# Patient Record
Sex: FEMALE | Race: BLACK OR AFRICAN AMERICAN | NOT HISPANIC OR LATINO | Employment: FULL TIME | URBAN - METROPOLITAN AREA
[De-identification: names, ages, dates, MRNs, and addresses within clinical notes are randomized per-mention and may not be internally consistent; named-entity substitution may affect disease eponyms.]

---

## 2017-04-07 ENCOUNTER — TRANSFERRED RECORDS (OUTPATIENT)
Dept: FAMILY MEDICINE | Facility: CLINIC | Age: 57
End: 2017-04-07

## 2021-04-12 ENCOUNTER — APPOINTMENT (OUTPATIENT)
Dept: GENERAL RADIOLOGY | Facility: CLINIC | Age: 61
End: 2021-04-12
Attending: EMERGENCY MEDICINE

## 2021-04-12 ENCOUNTER — HOSPITAL ENCOUNTER (EMERGENCY)
Facility: CLINIC | Age: 61
Discharge: HOME | End: 2021-04-12
Attending: EMERGENCY MEDICINE | Admitting: EMERGENCY MEDICINE

## 2021-04-12 VITALS
RESPIRATION RATE: 24 BRPM | SYSTOLIC BLOOD PRESSURE: 121 MMHG | HEART RATE: 84 BPM | DIASTOLIC BLOOD PRESSURE: 81 MMHG | WEIGHT: 208 LBS | BODY MASS INDEX: 33.43 KG/M2 | OXYGEN SATURATION: 98 % | TEMPERATURE: 98.5 F | HEIGHT: 66 IN

## 2021-04-12 DIAGNOSIS — M94.0 COSTOCHONDRITIS: ICD-10-CM

## 2021-04-12 DIAGNOSIS — R07.9 CHEST PAIN, UNSPECIFIED TYPE: Primary | ICD-10-CM

## 2021-04-12 LAB
ANION GAP SERPL CALCULATED.3IONS-SCNC: 4 MMOL/L (ref 3–14)
BASOPHILS # BLD AUTO: 0 10E9/L (ref 0–0.2)
BASOPHILS NFR BLD AUTO: 0.7 %
BUN SERPL-MCNC: 17 MG/DL (ref 7–30)
CALCIUM SERPL-MCNC: 10.3 MG/DL (ref 8.5–10.1)
CHLORIDE SERPL-SCNC: 110 MMOL/L (ref 94–109)
CO2 SERPL-SCNC: 26 MMOL/L (ref 20–32)
CREAT SERPL-MCNC: 0.74 MG/DL (ref 0.52–1.04)
D DIMER PPP FEU-MCNC: 0.3 UG/ML FEU (ref 0–0.5)
DIFFERENTIAL METHOD BLD: ABNORMAL
EOSINOPHIL # BLD AUTO: 0.2 10E9/L (ref 0–0.7)
EOSINOPHIL NFR BLD AUTO: 2.9 %
ERYTHROCYTE [DISTWIDTH] IN BLOOD BY AUTOMATED COUNT: 14.3 % (ref 10–15)
GFR SERPL CREATININE-BSD FRML MDRD: 88 ML/MIN/{1.73_M2}
GLUCOSE SERPL-MCNC: 171 MG/DL (ref 70–99)
HCT VFR BLD AUTO: 41.1 % (ref 35–47)
HGB BLD-MCNC: 12.9 G/DL (ref 11.7–15.7)
IMM GRANULOCYTES # BLD: 0 10E9/L (ref 0–0.4)
IMM GRANULOCYTES NFR BLD: 0.2 %
INTERPRETATION ECG - MUSE: NORMAL
LYMPHOCYTES # BLD AUTO: 1.3 10E9/L (ref 0.8–5.3)
LYMPHOCYTES NFR BLD AUTO: 21.3 %
MAGNESIUM SERPL-MCNC: 2.3 MG/DL (ref 1.6–2.3)
MCH RBC QN AUTO: 27.8 PG (ref 26.5–33)
MCHC RBC AUTO-ENTMCNC: 31.4 G/DL (ref 31.5–36.5)
MCV RBC AUTO: 89 FL (ref 78–100)
MONOCYTES # BLD AUTO: 0.5 10E9/L (ref 0–1.3)
MONOCYTES NFR BLD AUTO: 8.5 %
NEUTROPHILS # BLD AUTO: 3.9 10E9/L (ref 1.6–8.3)
NEUTROPHILS NFR BLD AUTO: 66.4 %
NRBC # BLD AUTO: 0 10*3/UL
NRBC BLD AUTO-RTO: 0 /100
PLATELET # BLD AUTO: 363 10E9/L (ref 150–450)
POTASSIUM SERPL-SCNC: 3.9 MMOL/L (ref 3.4–5.3)
RBC # BLD AUTO: 4.64 10E12/L (ref 3.8–5.2)
SODIUM SERPL-SCNC: 140 MMOL/L (ref 133–144)
TROPONIN I SERPL-MCNC: <0.015 UG/L (ref 0–0.04)
TSH SERPL DL<=0.005 MIU/L-ACNC: 1.46 MU/L (ref 0.4–4)
WBC # BLD AUTO: 5.9 10E9/L (ref 4–11)

## 2021-04-12 PROCEDURE — 83735 ASSAY OF MAGNESIUM: CPT | Performed by: EMERGENCY MEDICINE

## 2021-04-12 PROCEDURE — 99285 EMERGENCY DEPT VISIT HI MDM: CPT | Mod: 25

## 2021-04-12 PROCEDURE — 93005 ELECTROCARDIOGRAM TRACING: CPT

## 2021-04-12 PROCEDURE — 71046 X-RAY EXAM CHEST 2 VIEWS: CPT

## 2021-04-12 PROCEDURE — 84484 ASSAY OF TROPONIN QUANT: CPT | Performed by: EMERGENCY MEDICINE

## 2021-04-12 PROCEDURE — 85025 COMPLETE CBC W/AUTO DIFF WBC: CPT | Performed by: EMERGENCY MEDICINE

## 2021-04-12 PROCEDURE — 85379 FIBRIN DEGRADATION QUANT: CPT | Performed by: EMERGENCY MEDICINE

## 2021-04-12 PROCEDURE — 80048 BASIC METABOLIC PNL TOTAL CA: CPT | Performed by: EMERGENCY MEDICINE

## 2021-04-12 PROCEDURE — 84443 ASSAY THYROID STIM HORMONE: CPT | Performed by: EMERGENCY MEDICINE

## 2021-04-12 ASSESSMENT — ENCOUNTER SYMPTOMS
ARTHRALGIAS: 1
SHORTNESS OF BREATH: 1
NAUSEA: 0

## 2021-04-12 ASSESSMENT — MIFFLIN-ST. JEOR: SCORE: 1535.23

## 2021-04-12 NOTE — ED PROVIDER NOTES
History   Chief Complaint:  Chest Pain     HPI   Kelly Pineda is a 61 year old female with history of Type II Diabetes and COPD who presents with chest pain. Patient complains of constant right sided chest pain which began five days ago. 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 She noticed the pain when bending over and picking up items. She had difficulty sleeping throughout the week as she was not able to get comfortable. The chest pain radiates to her right shoulder blade and through her right upper back. She also describes a rash under her right breast. She received her last mammogram 2.5 years ago and has not felt any lumps. She describes shortness of breath when walking up the stairs but denies nausea. She had a stress test more than five years ago and has not had chest pain until today. She states she has a history of Type II Diabetes, hyperlipidemia, but no history of hypertension or heart disease. She has a family history of diabetes and heart disease, notably her father in his 40's.      Review of Systems   Respiratory: Positive for shortness of breath.    Cardiovascular: Positive for chest pain.   Gastrointestinal: Negative for nausea.   Musculoskeletal: Positive for arthralgias (right shoulder ).   All other systems reviewed and are negative.    Allergies:  No known drug allergies    Medications:  Aspirin 81 mg   Lipitor   Metformin     Past Medical History:    COPD  Diabetic Neuropathy   Type II Diabetes   Arthritis    Past Surgical History:    Vaginal Hysterectomy   Breast Biopsy     Family History:    She has a family history of diabetes and heart disease, notably her father in his 40's.      Social History:  Arrives unaccompanied.     Physical Exam     Patient Vitals for the past 24 hrs:   BP Temp Temp src Pulse Resp SpO2 Height Weight   04/12/21 1450 121/81 -- -- 84 -- -- -- --   04/12/21 1340 -- -- -- 80 24 -- -- --   04/12/21 1310 -- -- -- 82 18 -- -- --  "  04/12/21 1136 -- -- -- -- -- -- -- 94.3 kg (208 lb)   04/12/21 1127 139/82 98.5  F (36.9  C) Temporal 111 14 98 % 1.676 m (5' 6\") --     Physical Exam  General: Alert, appears well-developed and well-nourished. Cooperative.     In mild distress  HEENT:  Head:  Atraumatic  Ears:  External ears are normal  Mouth/Throat:  Oropharynx is without erythema or exudate and mucous membranes are moist.   Eyes:   Conjunctivae normal and EOM are normal. No scleral icterus.  CV:  Normal rate, regular rhythm, normal heart sounds and radial pulses are 2+ and symmetric.  No murmur.  Resp:  Breath sounds are clear bilaterally    Non-labored, no retractions or accessory muscle use  GI:  Abdomen is soft, no distension, no tenderness. No rebound or guarding.  No CVA tenderness bilaterally  Breast: Breast examination performed in presence of female nurse.  Patient with mild hyperpigmentation to the skin overlying the inferior right breast.  No mass was detected on the right breast examination.  No rash was noticed nor evidence of shingles.  No discharge from the right nipple.  MS:  Normal range of motion. No edema.    Tenderness to right anterior cartilage/rib interface, suspicious for costochondritis.  No crepitus.     Normal strength in all 4 extremities.     Back atraumatic.    No midline cervical, thoracic, or lumbar tenderness  Skin:  Warm and dry.  No rash or lesions noted.  Neuro: Alert. Normal strength.  GCS: 15  Psych:  Normal mood and affect.    Emergency Department Course   ECG  ECG taken at 1129, ECG read at 1216  Sinus tachycardia   Biatrial enlargement   Nonspecific ST and T wave abnormality   Abnormal ECG   Rate 106 bpm. VA interval 142 ms. QRS duration 82 ms. QT/QTc 314/417 ms. P-R-T axes 72 25 128.     Imaging:  XR Chest 2 Views  No radiographic evidence of acute chest abnormality.   Reading per radiology    Laboratory:  CBC: WBC 5.9, HGB 12.9,    BMP: Glucose 171 (H), Chloride 110 (H), o/w WNL (Creatinine: " 0.74)    D Dimer (Collected 1243): 0.3  Troponin (Collected 1243): <0.015  TSH with free T4 reflex: 1.46  Magnesium: 2.3    Emergency Department Course:    Reviewed:  I reviewed nursing notes, past medical history and care everywhere    Assessments:  1213 I obtained history and examined the patient as noted above.   1420 I rechecked the patient and explained findings.     Disposition:  The patient was discharged to home.     Impression & Plan     Medical Decision Making:  Kelly Pineda is a 61 year old female who presents for evaluation of chest pain.  This pain has lasted for >5 days.  Initial laboratory and imaging tests have come back normal.  There has been no progression of symptoms or other new concerning symptoms.        I considered a broad differential diagnosis in this patient including life-threatening etiologies such as acute coronary syndrome, myocardial infarction, pulmonary embolism, acute aortic dissection, myocarditis, pericarditis, acute valvular insufficiency amongst others.  Other causes considered for this patient included pneumonia, pneumothorax, chest wall source, pericarditis, pleurisy, esophageal spasm, etc.  No serious etiology for the chest pain were detected today during this visit.  Exceedingly low risk for unstable angina at this point and will therefore not admit formally and administer heparin.    Discussed HEART score with patient and shared decision making regarding disposition and further workup was done. I will set the patient up for an outpatient stress echocardiogram within 72 hours.  Patient will follow up with her primary care provider to obtain results from this outpatient stress echocardiogram.  She also return sooner to the emergency department for any new or worsening complaints.  After all questions answered, discharged home.    Diagnosis:    ICD-10-CM    1. Chest pain, unspecified type  R07.9 Exercise Stress Echocardiogram   2. Costochondritis  M94.0      Scribe  Disclosure:  I, Inocencio Jasso, am serving as a scribe at 12:07 PM on 4/12/2021 to document services personally performed by Javier Samayoa MD based on my observations and the provider's statements to me.              Javier Samayoa MD  04/12/21 1904

## 2021-04-12 NOTE — ED TRIAGE NOTES
CP in the right upper chest since last Thursday, radiates thru to the right shoulder blade area and the right upper back. No nausea, SOB only when walking up stairs.

## 2023-02-03 NOTE — PATIENT INSTRUCTIONS

## 2023-02-03 NOTE — PROGRESS NOTES
SUBJECTIVE:   CC: Kelly Pineda is an 63 year old woman who presents for preventive health visit.     Joints, knees are shot, hard to walk far.    Diabetes has been off of meds and testing for a while.  Had been on Metformin    Patient has been advised of split billing requirements and indicates understanding: Yes    Healthy Habits:PROBLEMS TO ADD ON...    t three servings of calcium containing foods daily (dairy, green leafy vegetables, etc.)? no    Amount of exercise or daily activities, outside of work: 5 day(s) per week walks to work and back 15-20 minutes     Problems taking medications regularly No    Medication side effects: No    Have you had an eye exam in the past two years? no    Do you see a dentist twice per year? no    Do you have sleep apnea, excessive snoring or daytime drowsiness?no  HEARING FREQUENCY:   Right Ear:     500 Hz :  pass   1000 Hz:  pass   2000 Hz:  pass   4000 Hz:  pass  Left Ear:     500 Hz :  pass   1000 Hz:  pass   2000 Hz:  pass   4000 Hz:  pass  Aurora Bernal CMA 2023        Today's PHQ-2 Score:   PHQ-2 (  [employer]) 2023   Q1: Little interest or pleasure in doing things 1   Q2: Feeling down, depressed or hopeless 1   PHQ-2 Score 2       Abuse: Current or Past(Physical, Sexual or Emotional)- No  Do you feel safe in your environment? Yes    Have you ever done Advance Care Planning? (For example, a Health Directive, POLST, or a discussion with a medical provider or your loved ones about your wishes): No, advance care planning information given to patient to review.  Advanced care planning was discussed at today's visit.    Social History     Tobacco Use     Smoking status: Former     Packs/day: 0.50     Years: 16.00     Pack years: 8.00     Types: Cigarettes     Start date:      Quit date: 2021     Years since quittin.1     Smokeless tobacco: Never   Substance Use Topics     Alcohol use: Not Currently     If you drink alcohol do you typically have >3  drinks per day or >7 drinks per week? No                     Reviewed orders with patient.  Reviewed health maintenance and updated orders accordingly - Yes  Lab work is in process    FHS-7: No flowsheet data found.  click delete button to remove this line now  Mammogram Screening: Recommended mammography every 1-2 years with patient discussion and risk factor consideration  Pertinent mammograms are reviewed under the imaging tab.    Pertinent mammograms are reviewed under the imaging tab.  History of abnormal Pap smear: Status post benign hysterectomy. Health Maintenance and Surgical History updated.     Reviewed and updated as needed this visit by clinical staff   Tobacco  Allergies  Meds  Problems  Med Hx  Surg Hx  Fam Hx  Soc   Hx        Reviewed and updated as needed this visit by Provider   Tobacco  Allergies  Meds  Problems  Med Hx  Surg Hx  Fam Hx  Soc   Hx       Past Medical History:   Diagnosis Date     Diabetes mellitus (H)       Past Surgical History:   Procedure Laterality Date     BREAST BIOPSY, RT/LT  2002    benign     HYSTERECTOMY RADICAL, SALPINGO-OOPHORECTOMY  2005    cervix removed, done for severe fibroids, paps no longer required       ROS:  CONSTITUTIONAL: NEGATIVE for fever, chills, change in weight  INTEGUMENTARY/SKIN: NEGATIVE for worrisome rashes, moles or lesions  EYES: NEGATIVE for vision changes or irritation  ENT: NEGATIVE for ear, mouth and throat problems  RESP: NEGATIVE for significant cough or SOB  BREAST: NEGATIVE for masses, tenderness or discharge  CV: NEGATIVE for chest pain, palpitations or peripheral edema  GI: NEGATIVE for nausea, abdominal pain, heartburn, or change in bowel habits  : NEGATIVE for unusual urinary or vaginal symptoms. No vaginal bleeding.  MUSCULOSKELETAL: NEGATIVE for significant arthralgias or myalgia  NEURO: NEGATIVE for weakness, dizziness or paresthesias  PSYCHIATRIC: NEGATIVE for changes in mood or affect     OBJECTIVE:   BP (!) 141/95   " Pulse 96   Resp 16   Ht 1.651 m (5' 5\")   Wt 97.8 kg (215 lb 9.6 oz)   SpO2 96%   BMI 35.88 kg/m    EXAM:  GENERAL APPEARANCE: healthy, alert and no distress  EYES: Eyes grossly normal to inspection, PERRL and conjunctivae and sclerae normal  HENT: ear canals and TM's normal, nose and mouth without ulcers or lesions, oropharynx clear and oral mucous membranes moist  NECK: no adenopathy, no asymmetry, masses, or scars and thyroid normal to palpation  RESP: lungs clear to auscultation - no rales, rhonchi or wheezes  BREAST: normal without masses, tenderness or nipple discharge and no palpable axillary masses or adenopathy  CV: regular rate and rhythm, normal S1 S2, no S3 or S4, no murmur, click or rub, no peripheral edema and peripheral pulses strong  ABDOMEN: soft, nontender, no hepatosplenomegaly, no masses and bowel sounds normal  MS: no musculoskeletal defects are noted and gait is age appropriate without ataxia  SKIN: no suspicious lesions or rashes  NEURO: Normal strength and tone, sensory exam grossly normal, mentation intact and speech normal  PSYCH: mentation appears normal and affect normal/bright    Diagnostic Test Results:  Labs reviewed in Epic    ASSESSMENT/PLAN:   Kelly was seen today for consult, physical and hearing screening.    Diagnoses and all orders for this visit:    Routine general medical examination at a health care facility    Type 2 diabetes mellitus with hyperglycemia, without long-term current use of insulin (H)  Establishing care, check levels and next steps pending results.  -     Comp. Metabolic Panel (14) ([facility])  -     Lipid Profile ([facility])  -     CBC with Diff/Plt ([facility])  -     TSH ([facility])  -     Hemoglobin A1C ([facility])    Screening for heart disease    Screening for colon cancer    Encounter for HCV screening test for low risk patient  -     HCV Antibody ([facility])    Encounter for screening mammogram for malignant neoplasm of breast  -     MA Screening Digital Bilateral; " "Future    Screening for HIV without presence of risk factors  -     HIV 1/0/2 Rflx ([facility])  HTN  Reviewed her current HTN management. Discussed our goal for her is a systolic pressure at or below 128 and diastolic pressure at or below 83.  We today managed her prescriptions with refills ensured to ensure availabilty of current medications.  Discussed the importance for aggressive management of HTN to prevent vascular complications later.  Recommended lower fat, lower carbohydrate, and lower sodium (<2000 mg)diet. Required intervals for follow up on HTN, lab studies reviewed.    Strongly recommened she follow her blood pressures outside the clinic to ensure that BPs are remaining within guidelines,.  Instructed to contact me if the readings are not within guidelines on a regular basis so we can adjust treatment as needed.        Patient has been advised of split billing requirements and indicates understanding: Yes  COUNSELING:   Reviewed preventive health counseling, as reflected in patient instructions       Regular exercise       Healthy diet/nutrition    Estimated body mass index is 35.88 kg/m  as calculated from the following:    Height as of this encounter: 1.651 m (5' 5\").    Weight as of this encounter: 97.8 kg (215 lb 9.6 oz).    Weight management plan: Discussed healthy diet and exercise guidelines    She reports that she quit smoking about 2 years ago. Her smoking use included cigarettes. She started smoking about 18 years ago. She has a 8.00 pack-year smoking history. She has never used smokeless tobacco.      Counseling Resources:  ATP IV Guidelines  Pooled Cohorts Equation Calculator  Breast Cancer Risk Calculator  BRCA-Related Cancer Risk Assessment: FHS-7 Tool  FRAX Risk Assessment  ICSI Preventive Guidelines  Dietary Guidelines for Americans, 2010  USDA's MyPlate  ASA Prophylaxis  Lung CA Screening    Ted Saravia MD  [facility]  "

## 2023-02-07 ENCOUNTER — OFFICE VISIT (OUTPATIENT)
Dept: FAMILY MEDICINE | Facility: CLINIC | Age: 63
End: 2023-02-07

## 2023-02-07 VITALS
WEIGHT: 215.6 LBS | SYSTOLIC BLOOD PRESSURE: 141 MMHG | DIASTOLIC BLOOD PRESSURE: 95 MMHG | RESPIRATION RATE: 16 BRPM | HEIGHT: 65 IN | HEART RATE: 96 BPM | OXYGEN SATURATION: 96 % | BODY MASS INDEX: 35.92 KG/M2

## 2023-02-07 DIAGNOSIS — Z13.6 SCREENING FOR HEART DISEASE: ICD-10-CM

## 2023-02-07 DIAGNOSIS — Z11.4 SCREENING FOR HIV WITHOUT PRESENCE OF RISK FACTORS: ICD-10-CM

## 2023-02-07 DIAGNOSIS — Z11.59 ENCOUNTER FOR HCV SCREENING TEST FOR LOW RISK PATIENT: ICD-10-CM

## 2023-02-07 DIAGNOSIS — Z12.11 SCREENING FOR COLON CANCER: ICD-10-CM

## 2023-02-07 DIAGNOSIS — I10 ESSENTIAL HYPERTENSION: ICD-10-CM

## 2023-02-07 DIAGNOSIS — Z12.31 ENCOUNTER FOR SCREENING MAMMOGRAM FOR MALIGNANT NEOPLASM OF BREAST: ICD-10-CM

## 2023-02-07 DIAGNOSIS — Z00.00 ROUTINE GENERAL MEDICAL EXAMINATION AT A HEALTH CARE FACILITY: Primary | ICD-10-CM

## 2023-02-07 DIAGNOSIS — E11.65 TYPE 2 DIABETES MELLITUS WITH HYPERGLYCEMIA, WITHOUT LONG-TERM CURRENT USE OF INSULIN (H): ICD-10-CM

## 2023-02-07 LAB
% GRANULOCYTES: 64.5 % (ref 42.2–75.2)
CHOL/HDL RATIO (RMG): 2.6 MG/DL (ref 0–4.5)
CHOLESTEROL: 189 MG/DL (ref 100–199)
HBA1C MFR BLD: 11.8 % (ref 4–6)
HCT VFR BLD AUTO: 36.7 % (ref 35–46)
HDL (RMG): 73 MG/DL (ref 40–?)
HEMOGLOBIN: 12 G/DL (ref 11.8–15.5)
LDL CALCULATED (RMG): 97 MG/DL (ref 0–130)
LYMPHOCYTES NFR BLD AUTO: 28.2 % (ref 20.5–51.1)
MCH RBC QN AUTO: 27.5 PG (ref 27–31)
MCHC RBC AUTO-ENTMCNC: 32.7 G/DL (ref 33–37)
MCV RBC AUTO: 84.1 FL (ref 80–100)
MONOCYTES NFR BLD AUTO: 7.3 % (ref 1.7–9.3)
PLATELET # BLD AUTO: 344 K/UL (ref 140–450)
RBC # BLD AUTO: 4.37 X10/CMM (ref 3.7–5.2)
TRIGLYCERIDES (RMG): 94 MG/DL (ref 0–149)
WBC # BLD AUTO: 5.2 X10/CMM (ref 3.8–11)

## 2023-02-07 PROCEDURE — 80061 LIPID PANEL: CPT | Mod: QW | Performed by: FAMILY MEDICINE

## 2023-02-07 PROCEDURE — 99386 PREV VISIT NEW AGE 40-64: CPT | Performed by: FAMILY MEDICINE

## 2023-02-07 PROCEDURE — 83036 HEMOGLOBIN GLYCOSYLATED A1C: CPT | Performed by: FAMILY MEDICINE

## 2023-02-07 PROCEDURE — 85025 COMPLETE CBC W/AUTO DIFF WBC: CPT | Performed by: FAMILY MEDICINE

## 2023-02-07 PROCEDURE — 36415 COLL VENOUS BLD VENIPUNCTURE: CPT | Performed by: FAMILY MEDICINE

## 2023-02-07 RX ORDER — LISINOPRIL 5 MG/1
5 TABLET ORAL DAILY
Qty: 90 TABLET | Refills: 0 | Status: SHIPPED | OUTPATIENT
Start: 2023-02-07 | End: 2023-04-04

## 2023-02-07 RX ORDER — METFORMIN HCL 500 MG
TABLET, EXTENDED RELEASE 24 HR ORAL 2 TIMES DAILY
COMMUNITY
End: 2023-04-04

## 2023-02-07 NOTE — LETTER
[facility] [address] [place]  [address]  Phone: [phone]    February 8, 2023      Kelly Crews  [address]  [facility] [address]              Dear Kelly,     As we suspected your diabetes is very high.   Good to have you back on medication and we will follow up as scheduled.     Ted Saravia MD       Results for orders placed or performed in visit on 02/07/23   Comp. Metabolic Panel (14) ([facility])     Status: Abnormal   Result Value Ref Range    Glucose 301 (H) 70 - 99 mg/dL    Urea Nitrogen 9 8 - 27 mg/dL    Creatinine 0.72 0.57 - 1.00 mg/dL    eGFR  95 >59 mL/min/1.73    BUN/Creatinine Ratio 13 12 - 28    Sodium 138 134 - 144 mmol/L    Potassium 4.6 3.5 - 5.2 mmol/L    Chloride 101 96 - 106 mmol/L    Total CO2 24 20 - 29 mmol/L    Calcium 10.4 (H) 8.7 - 10.3 mg/dL    Protein Total 8.1 6.0 - 8.5 g/dL    Albumin 4.2 3.8 - 4.8 g/dL    Globulin, Total 3.9 1.5 - 4.5 g/dL    A/G Ratio 1.1 (L) 1.2 - 2.2    Bilirubin Total 0.3 0.0 - 1.2 mg/dL    Alkaline Phosphatase 102 44 - 121 IU/L    AST 7 0 - 40 IU/L    ALT 10 0 - 32 IU/L    Narrative    Performed at:  01 - [facility] [address], [place], [place]  [id number]  : Salvador Da Silva MD, Phone:  [phone]   Lipid Profile ([facility])     Status: None   Result Value Ref Range    CHOLESTEROL 189 100 - 199 mg/dl    HDL 73 40 mg/dl    TRIGLYCERIDES ([facility]) 94 0 - 149 mg/dl    LDL CALCULATED ([facility]) 97 0 - 130 mg/dl    CHOL/HDL RATIO ([facility]) 2.6 0.0 - 4.5 mg/dl   CBC with Diff/Plt ([facility])     Status: Abnormal   Result Value Ref Range    WBC x10/cmm 5.2 3.8 - 11.0 x10/cmm    % Lymphocytes 28.2 20.5 - 51.1 %    % Monocytes 7.3 1.7 - 9.3 %    % Granulocytes 64.5 42.2 - 75.2 %    RBC x10/cmm 4.37 3.7 - 5.2 x10/cmm    Hemoglobin 12.0 11.8 - 15.5 g/dl    Hematocrit 36.7 35 - 46 %    MCV 84.1 80 - 100 fL    MCH 27.5 27.0 - 31.0 pg    MCHC 32.7 (A) 33.0 - 37.0 g/dL    Platelet Count 344 140 - 450 K/uL   TSH ([facility])     Status: None   Result  Value Ref Range    TSH 1.220 0.450 - 4.500 uIU/mL    Narrative    Performed at:  01 - [facility] [address], [place], [place]  [id number]  : Salvador Da Silva MD, Phone:  [phone]   HIV 1/0/2 Rflx ([facility])     Status: None   Result Value Ref Range    HIV Screen 4th Gen with Rflx Non Reactive Non Reactive    Narrative    Performed at:  01 - [facility] [address], [place], [place]  [id number]  : Salvador Da Silva MD, Phone:  [phone]   HCV Antibody ([facility])     Status: None   Result Value Ref Range    Hep C Virus Ab <0.1 0.0 - 0.9 s/co ratio    Narrative    Performed at:  01 - [facility] [address], [place], [place]  [id number]  : Salvador Da Silva MD, Phone:  [phone]   Hemoglobin A1C ([facility])     Status: Abnormal   Result Value Ref Range    Hemoglobin A1C 11.8 (A) 4.0 - 6.0 %

## 2023-02-08 LAB
ALBUMIN SERPL-MCNC: 4.2 G/DL (ref 3.8–4.8)
ALBUMIN/GLOB SERPL: 1.1 {RATIO} (ref 1.2–2.2)
ALP SERPL-CCNC: 102 IU/L (ref 44–121)
ALT SERPL-CCNC: 10 IU/L (ref 0–32)
AST SERPL-CCNC: 7 IU/L (ref 0–40)
BILIRUB SERPL-MCNC: 0.3 MG/DL (ref 0–1.2)
BUN SERPL-MCNC: 9 MG/DL (ref 8–27)
BUN/CREATININE RATIO: 13 (ref 12–28)
CALCIUM SERPL-MCNC: 10.4 MG/DL (ref 8.7–10.3)
CHLORIDE SERPLBLD-SCNC: 101 MMOL/L (ref 96–106)
CREAT SERPL-MCNC: 0.72 MG/DL (ref 0.57–1)
EGFR: 95 ML/MIN/1.73
GLOBULIN, TOTAL: 3.9 G/DL (ref 1.5–4.5)
GLUCOSE SERPL-MCNC: 301 MG/DL (ref 70–99)
HCV AB SERPL QL IA: <0.1 S/CO RATIO (ref 0–0.9)
HIV SCREEN 4TH GEN WITH RFLX: NON REACTIVE
POTASSIUM SERPL-SCNC: 4.6 MMOL/L (ref 3.5–5.2)
PROT SERPL-MCNC: 8.1 G/DL (ref 6–8.5)
SODIUM SERPL-SCNC: 138 MMOL/L (ref 134–144)
TOTAL CO2: 24 MMOL/L (ref 20–29)
TSH BLD-ACNC: 1.22 UIU/ML (ref 0.45–4.5)

## 2023-02-08 NOTE — RESULT ENCOUNTER NOTE
Dear Kelly,     As we suspected your diabetes is very high.  Good to have you back on medication and we will follow up as scheduled.    Ted Saravia MD

## 2023-02-16 ENCOUNTER — HOSPITAL ENCOUNTER (OUTPATIENT)
Dept: MAMMOGRAPHY | Facility: CLINIC | Age: 63
Discharge: HOME | End: 2023-02-16
Attending: FAMILY MEDICINE | Admitting: FAMILY MEDICINE
Payer: COMMERCIAL

## 2023-02-16 DIAGNOSIS — Z12.31 ENCOUNTER FOR SCREENING MAMMOGRAM FOR MALIGNANT NEOPLASM OF BREAST: ICD-10-CM

## 2023-02-16 PROCEDURE — 77067 SCR MAMMO BI INCL CAD: CPT

## 2023-02-17 ENCOUNTER — TRANSFERRED RECORDS (OUTPATIENT)
Dept: FAMILY MEDICINE | Facility: CLINIC | Age: 63
End: 2023-02-17

## 2023-03-31 NOTE — PROGRESS NOTES
Answers for HPI/ROS submitted by the patient on 3/28/2023  Your back pain is: chronic  Where is your back pain located? : right middle of back, left middle of back, right upper back, left upper back  How would you describe your back pain? : cramping, sharp, shooting  Where does your back pain spread? : nowhere  Since you noticed your back pain, how has it changed? : gradually worsening  Does your back pain interfere with your job?: Yes  How many servings of fruits and vegetables do you eat daily?: 4 or more  On average, how many sweetened beverages do you drink each day (Examples: soda, juice, sweet tea, etc.  Do NOT count diet or artificially sweetened beverages)?: 1  How many minutes a day do you exercise enough to make your heart beat faster?: 30 to 60  How many days a week do you exercise enough to make your heart beat faster?: 6  How many days per week do you miss taking your medication?: 0  What is the reason for your visit today?: Follow up, back pain and stuffness at times  When did your symptoms begin?: More than a month  What are your symptoms?: Back spasms, at times can't stand straight up, feels like back locks up, very painful and affects my breathing.  How would you describe these symptoms?: Severe  Are your symptoms:: Worsening  Have you had these symptoms before?: Yes  Have you tried or received treatment for these symptoms before?: No  Is there anything that makes you feel worse?: Sometimes everything, walking, sitting and standing  Is there anything that makes you feel better?: No    Got immunizations.  Lost metformin and so has been off of meds.    Back is sore a little    Scratchy throat with negative covid today.    htn follow up    Problem(s) Oriented visit      ROS:  General and Resp. completed and negative except as noted above     HISTORY:   reports that she does not currently use alcohol.   reports that she quit smoking about 2 years ago. Her smoking use included cigarettes. She started  smoking about 18 years ago. She has a 8.00 pack-year smoking history. She has never used smokeless tobacco.    Past Medical History:   Diagnosis Date     Diabetes mellitus (H)      Past Surgical History:   Procedure Laterality Date     BREAST BIOPSY, RT/LT  2002    benign     HYSTERECTOMY RADICAL, SALPINGO-OOPHORECTOMY  2005    cervix removed, done for severe fibroids, paps no longer required       EXAM:  BP: 139/97   Pulse: 97    Temp: Data Unavailable    Wt Readings from Last 2 Encounters:   04/04/23 98 kg (216 lb)   02/07/23 97.8 kg (215 lb 9.6 oz)       BMI= Body mass index is 35.94 kg/m .    EXAM:  APPEARANCE: = Relaxed and in no distress  Ears/Nose = External structures and Nares have usual shape and form  Lips/Teeth/Gums = No lesions seen, good dentition, and gums seem healthy  Resp effort = Calm regular breathing  Breath Sounds = Good air movement with no rales or rhonchi in any lung fields  Heart Rate, Rythym, & sounds (no Murm)  = Regular rate and rythym with no S3, S4, or murmer appreciated.  Ext (edema) =  1+ and to mid shin  Foot exam = Monofilament exam is with decreased sensation  Recent/Remote Memory = Alert and Oriented x 3      Assessment/Plan:  Kelly was seen today for recheck.    Diagnoses and all orders for this visit:    Type 2 diabetes mellitus with hyperglycemia, without long-term current use of insulin (H)  Not started Metformin yet>.....  -     Adult Eye  Referral; Future  -     Microalbumin ([facility])  -     [place] FOOT EXAM NO CHARGE  -     Basic Metabolic Panel (8) ([facility])  -     metFORMIN (GLUCOPHAGE XR) 500 MG 24 hr tablet; Take 1 tablet (500 mg) by mouth 2 times daily  -     Med Therapy Management Referral    Smoking history  -     CT Chest Lung Cancer Screen Low Dose Without; Future    Essential hypertension  Increase to 10 and recheck in 2 months and   -     lisinopril (ZESTRIL) 10 MG tablet; Take 1 tablet (10 mg) by mouth daily    H/O total hysterectomy no need for  "pap/.      COUNSELING:   reports that she quit smoking about 2 years ago. Her smoking use included cigarettes. She started smoking about 18 years ago. She has a 8.00 pack-year smoking history. She has never used smokeless tobacco.    Estimated body mass index is 35.94 kg/m  as calculated from the following:    Height as of 2/7/23: 1.651 m (5' 5\").    Weight as of this encounter: 98 kg (216 lb).       Appropriate preventive services were discussed with this patient, including applicable screening as appropriate for cardiovascular disease, diabetes, osteopenia/osteoporosis, and glaucoma.  As appropriate for age/gender, discussed screening for colorectal cancer, prostate cancer, breast cancer, and cervical cancer. Checklist reviewing preventive services available has been given to the patient.    Reviewed patients plan of care and provided an AVS. The Basic Care Plan (routine screening as documented in Health Maintenance) for Kelly meets the Care Plan requirement. This Care Plan has been established and reviewed with the  Patient.      The following health maintenance items are reviewed in Epic and correct as of today:  Health Maintenance   Topic Date Due     MICROALBUMIN  Never done     EYE EXAM  Never done     LUNG CANCER SCREENING  Never done     COVID-19 Vaccine (4 - Booster for Pfizer series) 04/12/2022     ZOSTER IMMUNIZATION (2 of 2) 04/21/2023     A1C  05/07/2023     YEARLY PREVENTIVE VISIT  02/07/2024     BMP  02/07/2024     LIPID  02/07/2024     DIABETIC FOOT EXAM  04/04/2024     MAMMO SCREENING  02/16/2025     COLORECTAL CANCER SCREENING  11/03/2025     ADVANCE CARE PLANNING  02/07/2028     DTAP/TDAP/TD IMMUNIZATION (3 - Td or Tdap) 03/16/2033     HEPATITIS C SCREENING  Completed     HIV SCREENING  Completed     INFLUENZA VACCINE  Completed     Pneumococcal Vaccine: Pediatrics (0 to 5 Years) and At-Risk Patients (6 to 64 Years)  Completed     IPV IMMUNIZATION  Aged Out     MENINGITIS IMMUNIZATION  Aged Out "     PAP  Discontinued       Ted Saravia  [facility]  For any issues my office # is [phone]

## 2023-04-04 ENCOUNTER — OFFICE VISIT (OUTPATIENT)
Dept: FAMILY MEDICINE | Facility: CLINIC | Age: 63
End: 2023-04-04

## 2023-04-04 VITALS
RESPIRATION RATE: 16 BRPM | OXYGEN SATURATION: 94 % | DIASTOLIC BLOOD PRESSURE: 97 MMHG | HEART RATE: 97 BPM | WEIGHT: 216 LBS | BODY MASS INDEX: 35.94 KG/M2 | SYSTOLIC BLOOD PRESSURE: 139 MMHG

## 2023-04-04 DIAGNOSIS — I10 ESSENTIAL HYPERTENSION: ICD-10-CM

## 2023-04-04 DIAGNOSIS — Z90.710 H/O TOTAL HYSTERECTOMY: ICD-10-CM

## 2023-04-04 DIAGNOSIS — Z87.891 SMOKING HISTORY: ICD-10-CM

## 2023-04-04 DIAGNOSIS — E11.65 TYPE 2 DIABETES MELLITUS WITH HYPERGLYCEMIA, WITHOUT LONG-TERM CURRENT USE OF INSULIN (H): Primary | ICD-10-CM

## 2023-04-04 PROBLEM — E78.00 HYPERCHOLESTEREMIA: Status: ACTIVE | Noted: 2017-10-16

## 2023-04-04 PROBLEM — H52.209 MYOPIA WITH ASTIGMATISM AND PRESBYOPIA: Status: ACTIVE | Noted: 2017-11-21

## 2023-04-04 PROBLEM — M17.10 ARTHRITIS OF KNEE: Status: ACTIVE | Noted: 2017-10-19

## 2023-04-04 PROBLEM — H52.10 MYOPIA WITH ASTIGMATISM AND PRESBYOPIA: Status: ACTIVE | Noted: 2017-11-21

## 2023-04-04 PROBLEM — E21.1 HYPERPARATHYROIDISM DUE TO VITAMIN D DEFICIENCY (H): Status: ACTIVE | Noted: 2017-11-23

## 2023-04-04 PROBLEM — F32.1 MODERATE SINGLE CURRENT EPISODE OF MAJOR DEPRESSIVE DISORDER (H): Status: ACTIVE | Noted: 2018-04-07

## 2023-04-04 PROBLEM — B35.3 TINEA PEDIS OF BOTH FEET: Status: ACTIVE | Noted: 2017-03-07

## 2023-04-04 PROBLEM — E11.41 DIABETIC MONONEUROPATHY ASSOCIATED WITH TYPE 2 DIABETES MELLITUS (H): Status: ACTIVE | Noted: 2017-03-07

## 2023-04-04 PROBLEM — H52.4 MYOPIA WITH ASTIGMATISM AND PRESBYOPIA: Status: ACTIVE | Noted: 2017-11-21

## 2023-04-04 PROBLEM — E55.9 VITAMIN D DEFICIENCY: Status: ACTIVE | Noted: 2017-10-20

## 2023-04-04 PROBLEM — J44.9 CHRONIC OBSTRUCTIVE PULMONARY DISEASE (H): Status: ACTIVE | Noted: 2017-04-28

## 2023-04-04 PROCEDURE — 99207 PR FOOT EXAM NO CHARGE: CPT | Performed by: FAMILY MEDICINE

## 2023-04-04 PROCEDURE — 99214 OFFICE O/P EST MOD 30 MIN: CPT | Performed by: FAMILY MEDICINE

## 2023-04-04 RX ORDER — METFORMIN HCL 500 MG
500 TABLET, EXTENDED RELEASE 24 HR ORAL 2 TIMES DAILY
Qty: 180 TABLET | Refills: 0 | Status: SHIPPED | OUTPATIENT
Start: 2023-04-04 | End: 2023-05-23

## 2023-04-04 RX ORDER — LISINOPRIL 10 MG/1
10 TABLET ORAL DAILY
Qty: 90 TABLET | Refills: 1 | Status: SHIPPED | OUTPATIENT
Start: 2023-04-04 | End: 2023-05-23

## 2023-04-11 ENCOUNTER — HOSPITAL ENCOUNTER (OUTPATIENT)
Dept: CT IMAGING | Facility: CLINIC | Age: 63
Discharge: HOME | End: 2023-04-11
Attending: FAMILY MEDICINE | Admitting: FAMILY MEDICINE
Payer: COMMERCIAL

## 2023-04-11 DIAGNOSIS — Z87.891 SMOKING HISTORY: ICD-10-CM

## 2023-04-11 PROCEDURE — 71271 CT THORAX LUNG CANCER SCR C-: CPT

## 2023-04-13 ENCOUNTER — TELEPHONE (OUTPATIENT)
Dept: FAMILY MEDICINE | Facility: CLINIC | Age: 63
End: 2023-04-13

## 2023-04-13 NOTE — TELEPHONE ENCOUNTER
----- Message from Ted Saravia MD sent at 4/12/2023  1:09 PM CDT -----  Can you send to Dr. Majano to see if he would follow up on on the Lymph node?  KN

## 2023-04-13 NOTE — TELEPHONE ENCOUNTER
Called and spoke with patient regarding Ct results per Dr Saravia and Dr Conner Majano. Per Marie Saravia & Melecio enlarged lymph node is not worrisome, patient should continue to  have regular yearly low dose lung cancer screening CT. Patient advised to call clinic with questions or concerns. Whitney Muñiz

## 2023-04-24 ENCOUNTER — OFFICE VISIT (OUTPATIENT)
Dept: PHARMACY | Facility: PHYSICIAN GROUP | Age: 63
End: 2023-04-24
Payer: COMMERCIAL

## 2023-04-24 VITALS
DIASTOLIC BLOOD PRESSURE: 84 MMHG | OXYGEN SATURATION: 97 % | HEART RATE: 87 BPM | SYSTOLIC BLOOD PRESSURE: 152 MMHG | BODY MASS INDEX: 35.94 KG/M2 | WEIGHT: 216 LBS

## 2023-04-24 DIAGNOSIS — I10 BENIGN ESSENTIAL HYPERTENSION: ICD-10-CM

## 2023-04-24 DIAGNOSIS — E11.9 TYPE 2 DIABETES MELLITUS WITHOUT COMPLICATION, WITHOUT LONG-TERM CURRENT USE OF INSULIN (H): Primary | ICD-10-CM

## 2023-04-24 DIAGNOSIS — E78.00 HYPERCHOLESTEREMIA: ICD-10-CM

## 2023-04-24 DIAGNOSIS — Z87.891 HISTORY OF SMOKING: ICD-10-CM

## 2023-04-24 PROCEDURE — 99605 MTMS BY PHARM NP 15 MIN: CPT | Performed by: PHARMACIST

## 2023-04-24 PROCEDURE — 99607 MTMS BY PHARM ADDL 15 MIN: CPT | Performed by: PHARMACIST

## 2023-04-24 RX ORDER — DIPHENHYDRAMINE HYDROCHLORIDE 25 MG/1
1 CAPSULE, LIQUID FILLED ORAL DAILY
Qty: 1 KIT | Refills: 0 | Status: SHIPPED | OUTPATIENT
Start: 2023-04-24

## 2023-04-24 RX ORDER — ROSUVASTATIN CALCIUM 20 MG/1
20 TABLET, COATED ORAL DAILY
Qty: 90 TABLET | Refills: 1 | Status: SHIPPED | OUTPATIENT
Start: 2023-04-24

## 2023-04-24 RX ORDER — LISINOPRIL 20 MG/1
20 TABLET ORAL DAILY
Qty: 90 TABLET | Refills: 1 | Status: SHIPPED | OUTPATIENT
Start: 2023-04-24

## 2023-04-24 NOTE — PROGRESS NOTES
Medication Therapy Management (MTM) Encounter    ASSESSMENT:                            Medication Adherence/Access: See below for considerations    Type 2 Diabetes: Patient is not meeting A1c goal of < 7%. Patient would benefit from starting to test sugars daily. Continued lifestyle changes- discussed dietary modification efforts and focus on lower glycemic index foods. Recommend addition of GLP1 agonist weekly to help with sugar lowering and weight loss goals. Can move both metformin to dinner to keep 1 time of day, but okay to split up the pills to before and after the meal so not having to swallow both big pills at the same time. .    Hypertension: Patient is not meeting blood pressure goal of < 140/90mmHg. Patient would benefit from the following changes - increase lisinopril to 20mg daily. Will discuss cardiology family history with PCP and consider referral for testing or consult as next steps.     Hyperlipidemia: Patient is not on high intensity statin which is indicated based on 2019 ACC/AHA guidelines for lipid management.  Start rosuvastatin 20mg daily.     History Smoking: continue annual screening.    PLAN:                            1. Increase Lisinopril to 20mg daily    2. Start checking blood sugars once daily in the morning goal 80-130mg/dl, 2 hours after a meal <150mg/dl    3. Ozempic 0.25mg weekly for 4 weeks then increase to 0.5mg weekly    4. Start Rosuvastatin once daily with lisinopril    5. Okay to take both metformin around your dinner meal - 1 before and 1 after meal.     6. Discuss with Dr. Saravia if referral for stress test can be made.     Follow-up: Return in about 4 weeks (around 5/22/2023) for MTM visit in clinic.    SUBJECTIVE/OBJECTIVE:                          Kelly Pineda is a 63 year old female coming in for an initial visit. She was referred to me from Dr. Saravia.      Reason for visit: Discuss medication management.     Allergies/ADRs: Reviewed in chart  Past Medical History:  "Reviewed in chart  Tobacco: She reports that she quit smoking about 2 years ago. Her smoking use included cigarettes. She started smoking about 18 years ago. She has a 8.00 pack-year smoking history. She has never used smokeless tobacco.  Alcohol: none  Family history significant for 2 sisters with diabetes requiring dialysis, Lupus x 2, CAD, parents with T2DM, multiple forms of cancer- did not have details of pedigree outlined but mentioned.     Medication Adherence/Access:   Varied work schedule so inconsistent timing for medication. She is varied in the timing but splitting up metformin to meals, and taking lisinopril at 6:30pm with alarm and wants to keep this the same.     Type 2 Diabetes:   Metformin ER 500mg twice daily- mild loose stool at times in the start, but better now. Tablets are too big to take more than 1 at a time, so prefers to split up the administration.   Would be interested in further weight loss options.   Has not had other medications.     Blood Sugar Ranges (patient reported): not checking - need supplies to check- lost during the move  Symptoms of low blood sugar? none.   Symptoms of high blood sugar? none  Scheduled for eye exam  Diet/Exercise: has drastically changed her approach to eating by looking at labels focusing on lowering sodium and sugar. Has  Cutting down on pepsi. Self reports having \"sweet tooth\" so has been working hard to cut out sweets. Cooks at home regularly, bringing meals to work and has vegetables with each meal.  Aspirin: not at this time  Statin: none  ACEi/ARB: lisinopril  Lab Results   Component Value Date    A1C 11.8 02/07/2023       Hypertension:   Lisinopril 10mg daily    Patient does not self-monitor blood pressure.    Given family history and shortness of breath upon climbing stairs, she is wondering if she should have a stress test. She was seen in 2021 for chest pain with stress echo ordered, but no results noted. Patient was out of state after that. "   Patient reports no current medication side effects.  BP Readings from Last 3 Encounters:   04/24/23 (!) 152/84   04/04/23 (!) 139/97   02/07/23 (!) 141/95       Hyperlipidemia:   no current medications  The 10-year ASCVD risk score (Kirsten LEONE, et al., 2019) is: 21.4%    Values used to calculate the score:      Age: 63 years      Sex: Female      Is Non- : Yes      Diabetic: Yes      Tobacco smoker: No      Systolic Blood Pressure: 152 mmHg      Is BP treated: Yes      HDL Cholesterol: 73 mg/dl      Total Cholesterol: 189 mg/dl    Recent Labs   Lab Test 02/07/23  1130   CHOL 189   HDL 73   LDL 97   TRIG 94       Smoking history:   CT lung screening enlarged lymph node, however discussed with Dr. Majano and plan for yearly low dose lung cancer screening CT.       Today's Vitals: BP (!) 152/84   Pulse 87   Wt 216 lb (98 kg)   SpO2 97%   BMI 35.94 kg/m    ----------------      I spent 40 minutes with this patient today. All changes were made via collaborative practice agreement with Ted Saravia MD. A copy of the visit note was provided to the patient's provider(s).    A summary of these recommendations was given to the patient.    Aurora Guzman, Pharm.D, [facility]  Medication Therapy Management Pharmacist  [phone]       Medication Therapy Recommendations  Benign essential hypertension    Current Medication: lisinopril (ZESTRIL) 10 MG tablet   Rationale: Dose too low - Dosage too low - Effectiveness   Recommendation: Increase Dose - lisinopril 20 MG tablet   Status: Accepted per CPA         Hypercholesteremia    Current Medication: rosuvastatin (CRESTOR) 20 MG tablet   Rationale: Untreated condition - Needs additional medication therapy - Indication   Recommendation: Start Medication - rosuvastatin 20 MG tablet   Status: Accepted per CPA         Type 2 diabetes mellitus without complication, without long-term current use of insulin (H)    Current Medication: blood glucose (NO  BRAND SPECIFIED) test strip   Rationale: Medication requires monitoring - Needs additional monitoring - Effectiveness   Recommendation: Self-Monitoring - test daily   Status: Accepted per CPA          Current Medication: metFORMIN (GLUCOPHAGE XR) 500 MG 24 hr tablet   Rationale: Patient forgets to take - Adherence - Adherence   Recommendation: Change Administration Time - move both tablets to dinner   Status: Patient Agreed - Adherence/Education          Current Medication: semaglutide (OZEMPIC) 2 MG/3ML pen   Rationale: Synergistic therapy - Needs additional medication therapy - Indication   Recommendation: Start Medication - 0.25mg weekly   Status: Accepted per CPA

## 2023-04-24 NOTE — Clinical Note
Can you take a look and see if you would recommend a stress test for her, or if you would prefer a cards referral first?

## 2023-04-24 NOTE — PATIENT INSTRUCTIONS
"Recommendations from today's MTM visit:                                                       1. Increase Lisinopril to 20mg daily    2. Start checking blood sugars once daily in the morning goal 80-130mg/dl, 2 hours after a meal <150mg/dl    3. Ozempic 0.25mg weekly for 4 weeks then increase to 0.5mg weekly    4. Start Rosuvastatin once daily with lisinopril    5. Okay to take both metformin around your dinner meal - 1 before and 1 after meal.     Follow-up: Return in about 4 weeks (around 5/22/2023) for MTM visit in clinic.    It was great speaking with you today.  I value your experience and would be very thankful for your time in providing feedback in our clinic survey. In the next few days, you may receive an email or text message from [employer] with a link to a survey related to your  clinical pharmacist.\"     My Clinical Pharmacist's contact information:                                                      Please feel free to contact me with any questions or concerns you have.      Aurora Guzman, Pharm.D, [facility]  Medication Therapy Management Pharmacist  [phone]      "

## 2023-05-03 ENCOUNTER — TRANSFERRED RECORDS (OUTPATIENT)
Dept: FAMILY MEDICINE | Facility: CLINIC | Age: 63
End: 2023-05-03

## 2023-05-03 LAB — RETINOPATHY: NEGATIVE

## 2023-07-02 ENCOUNTER — HOSPITAL ENCOUNTER (EMERGENCY)
Facility: CLINIC | Age: 63
Discharge: HOME | End: 2023-07-02
Attending: EMERGENCY MEDICINE | Admitting: EMERGENCY MEDICINE
Payer: COMMERCIAL

## 2023-07-02 ENCOUNTER — APPOINTMENT (OUTPATIENT)
Dept: GENERAL RADIOLOGY | Facility: CLINIC | Age: 63
End: 2023-07-02
Attending: EMERGENCY MEDICINE
Payer: COMMERCIAL

## 2023-07-02 VITALS
TEMPERATURE: 98.6 F | RESPIRATION RATE: 16 BRPM | DIASTOLIC BLOOD PRESSURE: 74 MMHG | OXYGEN SATURATION: 97 % | BODY MASS INDEX: 34.55 KG/M2 | WEIGHT: 215 LBS | HEART RATE: 108 BPM | HEIGHT: 66 IN | SYSTOLIC BLOOD PRESSURE: 124 MMHG

## 2023-07-02 DIAGNOSIS — M25.462 EFFUSION OF LEFT KNEE: ICD-10-CM

## 2023-07-02 PROCEDURE — 73562 X-RAY EXAM OF KNEE 3: CPT | Mod: LT

## 2023-07-02 PROCEDURE — 250N000013 HC RX MED GY IP 250 OP 250 PS 637: Performed by: EMERGENCY MEDICINE

## 2023-07-02 PROCEDURE — 99283 EMERGENCY DEPT VISIT LOW MDM: CPT

## 2023-07-02 RX ORDER — HYDROCODONE BITARTRATE AND ACETAMINOPHEN 5; 325 MG/1; MG/1
2 TABLET ORAL ONCE
Status: COMPLETED | OUTPATIENT
Start: 2023-07-02 | End: 2023-07-02

## 2023-07-02 RX ORDER — OXYCODONE AND ACETAMINOPHEN 5; 325 MG/1; MG/1
1 TABLET ORAL EVERY 6 HOURS PRN
Qty: 12 TABLET | Refills: 0 | Status: SHIPPED | OUTPATIENT
Start: 2023-07-02 | End: 2023-07-05

## 2023-07-02 RX ADMIN — HYDROCODONE BITARTRATE AND ACETAMINOPHEN 2 TABLET: 5; 325 TABLET ORAL at 09:12

## 2023-07-02 ASSESSMENT — ACTIVITIES OF DAILY LIVING (ADL): ADLS_ACUITY_SCORE: 33

## 2023-07-02 NOTE — ED TRIAGE NOTES
Mechanical fall yesterday, today has left knee pain and right elbow abrasion. Pt denies hitting head or  LOC.     Triage Assessment     Row Name 07/02/23 7166       Triage Assessment (Adult)    Airway WDL WDL       Respiratory WDL    Respiratory WDL WDL       Skin Circulation/Temperature WDL    Skin Circulation/Temperature WDL X  Right elbow abrasion       Cardiac WDL    Cardiac WDL WDL       Peripheral/Neurovascular WDL    Peripheral Neurovascular WDL WDL       Cognitive/Neuro/Behavioral WDL    Cognitive/Neuro/Behavioral WDL WDL

## 2023-07-02 NOTE — ED PROVIDER NOTES
"  History     Chief Complaint:  Fall and Knee Pain       HPI   Kelly Pineda is a 63 year old female with a history of DM II and COPD who presents for evaluation of a mechanical fall that occurred yesterday. She was out on a walk when she fell backwards, possibly twisting her knee. She denies head injury or loss of consciousness, but has an abrasion to her right elbow. As the night went on, the knee pain continued to worsen, and she was experiencing chills at this time. She has had limited range of motion since the injury. Today, the left knee pain persists.      Independent Historian:   None - Patient Only    Review of External Notes:   none    Medications:    Lisinopril  Crestor  Ozempic    Past Medical History:    DM II  COPD  Diabetic neuropathy  Hypercholesteremia  Depression    Past Surgical History:    Breast biopsy  TAHBSO     Physical Exam     Patient Vitals for the past 24 hrs:   BP Temp Temp src Pulse Resp SpO2 Height Weight   07/02/23 0832 124/74 98.6  F (37  C) Temporal 108 16 97 % 1.676 m (5' 6\") 97.5 kg (215 lb)        Physical Exam  General/Appearance: appears stated age, well-groomed, appears comfortable except when trying to flex/extend L knee  Eyes: EOMI, no scleral injection, no icterus  ENT: MMM  Neck: supple, nl ROM, no stiffness  Cardiovascular: RRR, nl S1S2, no m/r/g, 2+ pulses in all 4 extremities, cap refill <2sec  Respiratory: CTAB, good air movement throughout, no wheezes/rhonchi/rales, no increased WOB, no retractions  MSK: L knee slightly flexed and unable to fully extend LLE, mild effusion  Skin: warm and well-perfused, no rash, no edema, no ecchymosis, nl turgor, superficial abrasion to R elbow  Neuro: GCS 15, alert and oriented, no gross focal neuro deficits  Psych: interacts appropriately  Heme: no petechia, no purpura, no active bleeding        Emergency Department Course     Imaging:  XR Knee Left 3 Views   Final Result   IMPRESSION: Moderately large effusion. Mild tricompartmental " osteoarthrosis. Moderately large intra-articular body in the posterior aspect of the joint.       No evidence of fracture or lipohemarthrosis. Moderate lateral subluxation and tilt of the patella.            Report per radiology    Emergency Department Course & Assessments:    Interventions:  Medications   HYDROcodone-acetaminophen (NORCO) 5-325 MG per tablet 2 tablet (2 tablets Oral $Given 7/2/23 0912)        Assessments:  0838 I obtained history and examined the patient, as noted above.  0940 I rechecked and updated the patient.    Independent Interpretation (X-rays, CTs, rhythm strip):  None    Consultations/Discussion of Management or Tests:  None     Social Determinants of Health affecting care:   None    Disposition:  The patient was discharged to home.     Impression & Plan      Medical Decision Making:  This patient is a pleasant 63-year-old female who presents with left knee pain after a fall yesterday.  It sounds as though there was a twisting mechanism and she does have a fairly large joint effusion.  X-ray shows no obvious fracture though does show an intra-articular body which may be contributing to the effusion.  I think she probably needs an MRI so I have encouraged her to follow-up with [facility].  In the meantime she will be sent home with crutches.    Diagnosis:    ICD-10-CM    1. Effusion of left knee  M25.462            Discharge Medications:  Discharge Medication List as of 7/2/2023  9:46 AM           Scribe Disclosure:  IHung, am serving as a scribe at 8:38 AM on 7/2/2023 to document services personally performed by Teresa Murrieta MD based on my observations and the provider's statements to me.      Teresa Murrieta MD  07/02/23 1652       Teresa Murrieta MD  07/02/23 0754

## 2023-07-02 NOTE — LETTER
July 2, 2023      To Whom It May Concern:      Kelly Pineda was seen in our Emergency Department today, 07/02/23.  I expect her condition to improve over the next 7-14 days.  She may return to work as tolerated.    Sincerely,        Teresa Murrieta MD

## 2023-07-04 DIAGNOSIS — E11.9 TYPE 2 DIABETES MELLITUS WITHOUT COMPLICATION, WITHOUT LONG-TERM CURRENT USE OF INSULIN (H): ICD-10-CM

## 2023-07-05 NOTE — TELEPHONE ENCOUNTER
Ozempic. LOV 4/04/23 for dm check.    Type 2 diabetes mellitus with hyperglycemia, without long-term current use of insulin (H)  Not started Metformin yet>.....  Hemoglobin A1C   Date Value Ref Range Status   02/07/2023 11.8 (A) 4.0 - 6.0 % Final

## 2023-07-06 RX ORDER — SEMAGLUTIDE 0.68 MG/ML
INJECTION, SOLUTION SUBCUTANEOUS
Qty: 3 ML | Refills: 1 | Status: SHIPPED | OUTPATIENT
Start: 2023-07-06

## 2023-07-25 ENCOUNTER — OFFICE VISIT (OUTPATIENT)
Dept: PHARMACY | Facility: PHYSICIAN GROUP | Age: 63
End: 2023-07-25
Payer: COMMERCIAL

## 2023-07-25 DIAGNOSIS — E11.9 TYPE 2 DIABETES MELLITUS WITHOUT COMPLICATION, WITHOUT LONG-TERM CURRENT USE OF INSULIN (H): Primary | ICD-10-CM

## 2023-07-25 DIAGNOSIS — E78.00 HYPERCHOLESTEREMIA: ICD-10-CM

## 2023-07-25 DIAGNOSIS — E11.65 TYPE 2 DIABETES MELLITUS WITH HYPERGLYCEMIA, WITHOUT LONG-TERM CURRENT USE OF INSULIN (H): Primary | ICD-10-CM

## 2023-07-25 LAB
ANION GAP SERPL CALCULATED.3IONS-SCNC: 9 MMOL/L (ref 7–15)
BUN SERPL-MCNC: 10.2 MG/DL (ref 8–23)
CALCIUM SERPL-MCNC: 10 MG/DL (ref 8.8–10.2)
CHLORIDE SERPL-SCNC: 104 MMOL/L (ref 98–107)
CHOLESTEROL: 132 MG/DL (ref 100–199)
CREAT SERPL-MCNC: 0.63 MG/DL (ref 0.51–0.95)
DEPRECATED HCO3 PLAS-SCNC: 26 MMOL/L (ref 22–29)
FASTING?: NO
GFR SERPL CREATININE-BSD FRML MDRD: >90 ML/MIN/1.73M2
GLUCOSE SERPL-MCNC: 163 MG/DL (ref 70–99)
HBA1C MFR BLD: 10.3 % (ref 4–6)
HDL (RMG): 58 MG/DL (ref 40–?)
LDL CALCULATED (RMG): 58 MG/DL (ref 0–130)
POTASSIUM SERPL-SCNC: 4.1 MMOL/L (ref 3.4–5.3)
SODIUM SERPL-SCNC: 139 MMOL/L (ref 136–145)
TRIGLYCERIDES (RMG): 82 MG/DL (ref 0–149)

## 2023-07-25 PROCEDURE — 99606 MTMS BY PHARM EST 15 MIN: CPT | Performed by: PHARMACIST

## 2023-07-25 PROCEDURE — 82310 ASSAY OF CALCIUM: CPT

## 2023-07-25 PROCEDURE — 80048 BASIC METABOLIC PNL TOTAL CA: CPT | Performed by: FAMILY MEDICINE

## 2023-07-25 PROCEDURE — 83036 HEMOGLOBIN GLYCOSYLATED A1C: CPT | Performed by: FAMILY MEDICINE

## 2023-07-25 PROCEDURE — 80061 LIPID PANEL: CPT | Mod: QW | Performed by: FAMILY MEDICINE

## 2023-07-25 PROCEDURE — 36415 COLL VENOUS BLD VENIPUNCTURE: CPT

## 2023-07-25 PROCEDURE — 36415 COLL VENOUS BLD VENIPUNCTURE: CPT | Performed by: FAMILY MEDICINE

## 2023-07-25 PROCEDURE — 99607 MTMS BY PHARM ADDL 15 MIN: CPT | Performed by: PHARMACIST

## 2023-07-25 RX ORDER — METFORMIN HCL 500 MG
500 TABLET, EXTENDED RELEASE 24 HR ORAL
Qty: 90 TABLET | Refills: 1 | Status: SHIPPED | OUTPATIENT
Start: 2023-07-25

## 2023-07-25 NOTE — PROGRESS NOTES
Medication Therapy Management (MTM) Encounter    ASSESSMENT:                            Medication Adherence/Access: No issues identified    Type 2 Diabetes: Patient is not meeting A1c goal of < 7%. Self monitoring of blood glucose is not at goal of fasting  mg/dL. Patient would benefit from starting Jardiance, discussed side effects and MOA in detail today. She will closely monitor genitourinary symptoms. Additionally should continue titration of Ozempic as able, but will need 1 more month of 0.5mg weekly. Due to patients concerns with her metformin was willing to compromise to 1 daily vs stopping at this time.     PLAN:                            1. Decrease metformin to ER 500mg once daily    2. Continue 4 more weeks of Ozempic 0.5mg weekly    3. Start Jardiance 10mg daily    Follow-up: Return in about 4 weeks (around 8/22/2023) for MTM visit in clinic.    SUBJECTIVE/OBJECTIVE:                          Kelly Pineda is a 63 year old female coming in for a follow-up visit from 4/24.       Reason for visit: medication follow up.    Allergies/ADRs: Reviewed in chart  Past Medical History: Reviewed in chart  Tobacco: She reports that she quit smoking about 2 years ago. Her smoking use included cigarettes. She started smoking about 18 years ago. She has a 8.00 pack-year smoking history. She has never used smokeless tobacco.  Alcohol: not currently using    Medication Adherence/Access:   Varied work schedule so inconsistent timing for medication. She is varied in the timing but splitting up metformin to meals, and taking lisinopril at 6:30pm with alarm and wants to keep this the same.     Type 2 Diabetes:   Metformin ER 500mg twice daily- mild loose stool at times in the start, doesn't want to keep taking.  Ozempic 0.5mg weekly- 2 shots at this level, picking up refill at pharmacy. Tolerating well, some decrease in appetite.   Curious about Jardiance.     Blood Sugar Ranges (patient reported): 132-236 range, Avg  "177mg/dl (7day)  14 day- 132-265, avg 194  30day 202 avg  90 day 222 avg  not checking - need supplies to check- lost during the move  Symptoms of low blood sugar? none.   Symptoms of high blood sugar? none  Scheduled for eye exam  Diet/Exercise: has drastically changed her approach to eating by looking at labels focusing on lowering sodium and sugar. Has  Cutting down on pepsi. Self reports having \"sweet tooth\" so has been working hard to cut out sweets. Cooks at home regularly, bringing meals to work and has vegetables with each meal.  Aspirin: not at this time  Statin: none  ACEi/ARB: lisinopril  Lab Results   Component Value Date    A1C 10.3 07/25/2023    A1C 11.8 02/07/2023       Wt Readings from Last 4 Encounters:   07/02/23 215 lb (97.5 kg)   04/24/23 216 lb (98 kg)   04/04/23 216 lb (98 kg)   02/07/23 215 lb 9.6 oz (97.8 kg)         Today's Vitals: There were no vitals taken for this visit.- missed due to epic downtime.  ----------------    I spent 30 minutes with this patient today. All changes were made via collaborative practice agreement with Ted Saravia MD. A copy of the visit note was provided to the patient's provider(s).    A summary of these recommendations was given to the patient.    Aurora Guzman, Pharm.D, [facility]  Medication Therapy Management Pharmacist  [phone]       Medication Therapy Recommendations  Type 2 diabetes mellitus without complication, without long-term current use of insulin (H)    Current Medication: empagliflozin (JARDIANCE) 10 MG TABS tablet   Rationale: Synergistic therapy - Needs additional medication therapy - Indication   Recommendation: Start Medication - empagliflozin 10 MG Tabs tablet - 1 daily   Status: Accepted per CPA          Current Medication: metFORMIN (GLUCOPHAGE XR) 500 MG 24 hr tablet   Rationale: Undesirable effect - Adverse medication event - Safety   Recommendation: Decrease Dose - 1 daily   Status: Accepted per CPA            "

## 2023-07-25 NOTE — PATIENT INSTRUCTIONS
"Recommendations from today's MTM visit:                                                       1. Decrease metformin to ER 500mg once daily    2. Continue 4 more weeks of Ozempic 0.5mg weekly    3. Start Jardiance 10mg daily    Follow-up: Return in about 4 weeks (around 8/22/2023) for MTM visit in clinic.    It was great speaking with you today.  I value your experience and would be very thankful for your time in providing feedback in our clinic survey. In the next few days, you may receive an email or text message from [employer] with a link to a survey related to your  clinical pharmacist.\"     My Clinical Pharmacist's contact information:                                                      Please feel free to contact me with any questions or concerns you have.      Aurora Guzman, Pharm.D, [facility]  Medication Therapy Management Pharmacist  [phone]      "

## 2023-09-16 DIAGNOSIS — E11.9 TYPE 2 DIABETES MELLITUS WITHOUT COMPLICATION, WITHOUT LONG-TERM CURRENT USE OF INSULIN (H): ICD-10-CM

## 2023-09-16 RX ORDER — EMPAGLIFLOZIN 10 MG/1
10 TABLET, FILM COATED ORAL DAILY
Qty: 30 TABLET | Refills: 0 | Status: SHIPPED | OUTPATIENT
Start: 2023-09-16 | End: 2023-10-22

## 2023-10-21 DIAGNOSIS — E11.9 TYPE 2 DIABETES MELLITUS WITHOUT COMPLICATION, WITHOUT LONG-TERM CURRENT USE OF INSULIN (H): ICD-10-CM

## 2023-10-22 RX ORDER — EMPAGLIFLOZIN 10 MG/1
10 TABLET, FILM COATED ORAL DAILY
Qty: 30 TABLET | Refills: 0 | Status: SHIPPED | OUTPATIENT
Start: 2023-10-22

## 2024-01-11 ENCOUNTER — TELEPHONE (OUTPATIENT)
Dept: FAMILY MEDICINE | Facility: CLINIC | Age: 64
End: 2024-01-11

## 2024-01-11 NOTE — TELEPHONE ENCOUNTER
1/11/24 spoke with patient, she will call us back to schedule and appointment with Nuzhat.    Adan Stacy,   [facility]  [phone]

## 2025-02-05 SDOH — HEALTH STABILITY: PHYSICAL HEALTH: ON AVERAGE, HOW MANY MINUTES DO YOU ENGAGE IN EXERCISE AT THIS LEVEL?: 10 MIN

## 2025-02-05 SDOH — HEALTH STABILITY: PHYSICAL HEALTH: ON AVERAGE, HOW MANY DAYS PER WEEK DO YOU ENGAGE IN MODERATE TO STRENUOUS EXERCISE (LIKE A BRISK WALK)?: 5 DAYS

## 2025-02-05 ASSESSMENT — SOCIAL DETERMINANTS OF HEALTH (SDOH): HOW OFTEN DO YOU GET TOGETHER WITH FRIENDS OR RELATIVES?: PATIENT DECLINED

## 2025-02-11 NOTE — PROGRESS NOTES
Preventive Care Visit  Formerly [facility]  Ted Saravia MD, Family Medicine  Feb 12, 2025      Assessment & Plan       Type 2 diabetes mellitus with hyperglycemia, without long-term current use of insulin (H)  Out of control and not on meds.  Will restart and recheck in 3 months  - Comprehensive metabolic panel  - VENOUS COLLECTION  - Hemoglobin A1C ([facility])  - CT FOOT EXAM NO CHARGE  - semaglutide (OZEMPIC, 0.25 OR 0.5 MG/DOSE,) 2 MG/3ML pen  Dispense: 3 mL; Refill: 3  - empagliflozin (JARDIANCE) 10 MG TABS tablet  Dispense: 30 tablet; Refill: 0    H/O hysterectomy for benign disease      Moderate single current episode of major depressive disorder (H)  Tolerating Depression medication with a good response to this treatment.  PHQ-9 score:        2/12/2025    12:36 PM   PHQ   PHQ-9 Total Score 5    Q9: Thoughts of better off dead/self-harm past 2 weeks Not at all       Patient-reported    today and we agreed that we will continue current treatment. We discussed ongoing management of her medications and how we will refill the medications now and in the future.   Next scheduled follow up in 6 months. Currently doing therapy and not medications  We breifly reviewed any questions she had about mood disorders including suspected pathophysiology, role of neurotransmitters, and treatment options including medication options ( especially SSRIs that treat cause of sx) and counselling.      Vitamin D deficiency      Benign essential hypertension  Reviewed her current HTN management. Discussed our goal for her is a systolic pressure at or below 128 and diastolic pressure at or below 83.  We today managed her prescriptions with refills ensured to ensure availabilty of current medications.  Discussed the importance for aggressive management of HTN to prevent vascular complications later.  Recommended lower fat, lower carbohydrate, and lower sodium (<2000 mg)diet. Required intervals for follow up on HTN, lab studies  "reviewed.    Strongly recommened she follow her blood pressures outside the clinic to ensure that BPs are remaining within guidelines,.  Instructed to contact me if the readings are not within guidelines on a regular basis so we can adjust treatment as needed.    - CBC with Diff/Plt ([facility])  - lisinopril (ZESTRIL) 10 MG tablet  Dispense: 90 tablet; Refill: 3    Hypercholesteremia  Restart the statin  - Lipid Profile ([facility])  - rosuvastatin (CRESTOR) 20 MG tablet  Dispense: 90 tablet; Refill: 1    Hyperparathyroidism due to vitamin D deficiency  On vit d    Smoking history  check- CT Chest Lung Cancer Screen Low Dose Without    Encounter for screening mammogram for malignant neoplasm of breast  Due for   - MA Screening Bilateral w/ Wally      Patient has been advised of split billing requirements and indicates understanding: Yes        BMI  Estimated body mass index is 33.43 kg/m  as calculated from the following:    Height as of this encounter: 1.664 m (5' 5.5\").    Weight as of this encounter: 92.5 kg (204 lb).   Weight management plan: Discussed healthy diet and exercise guidelines    Counseling  Appropriate preventive services were addressed with this patient via screening, questionnaire, or discussion as appropriate for fall prevention, nutrition, physical activity, Tobacco-use cessation, social engagement, weight loss and cognition.  Checklist reviewing preventive services available has been given to the patient.  Reviewed patient's diet, addressing concerns and/or questions.   The patient was instructed to see the dentist every 6 months.   She is at risk for psychosocial distress and has been provided with information to reduce risk.   The patient's PHQ-9 score is consistent with mild depression. She was provided with information regarding depression.       See Patient Instructions    Return in about 53 weeks (around 2/18/2026) for Annual Wellness Visit.    Jaye Mendoza is a 65 year old, presenting for the " following:  Physical (Non fasting - physical.), Consult (Patient experiencing fatigue, joint aches, has rash on face, shortness of breath. Unsure when symptoms started.//Has family history of lupus, worried it could be lupus.), Medication Request (Patient needs refills of jardiance, rosuvastatin, lisinopril, and ozempic.), and Imm/Inj (Patient reports getting flu shot this year. )          HPI  Here for check up and to follow upon the above.  Has run out of all medicines.  Health Care Directive  Patient does not have a Health Care Directive:   Hearing screen    Left ear:    500 HZ: Pass  1000 HZ: Pass  2000 HZ: Pass  4000 HZ: Pass    Right ear:    500 HZ: Pass  1000 HZ: Pass  2000 HZ: Pass  4000 HZ: Pass        2/5/2025   General Health   How would you rate your overall physical health? (!) FAIR   Feel stress (tense, anxious, or unable to sleep) To some extent   (!) STRESS CONCERN      2/5/2025   Nutrition   Three or more servings of calcium each day? (!) NO   Diet: Diabetic   How many servings of fruit and vegetables per day? (!) 2-3   How many sweetened beverages each day? 0-1         2/5/2025   Exercise   Days per week of moderate/strenous exercise 5 days   Average minutes spent exercising at this level 10 min         2/5/2025   Social Factors   Frequency of gathering with friends or relatives Patient declined   Worry food won't last until get money to buy more No   Food not last or not have enough money for food? No   Do you have housing? (Housing is defined as stable permanent housing and does not include staying ouside in a car, in a tent, in an abandoned building, in an overnight shelter, or couch-surfing.) Yes   Are you worried about losing your housing? Patient declined   Lack of transportation? No   Unable to get utilities (heat,electricity)? No         2/5/2025   Fall Risk   Fallen 2 or more times in the past year? Yes   Trouble with walking or balance? Yes           2/5/2025   Dental   Dentist two times  every year? (!) NO                    2025   Substance Use   Alcohol more than 3/day or more than 7/wk No   Do you use any other substances recreationally? No     Social History     Tobacco Use    Smoking status: Former     Current packs/day: 0.00     Average packs/day: 1 pack/day for 26.0 years (26.0 ttl pk-yrs)     Types: Cigarettes     Start date:      Quit date: 2021     Years since quittin.1    Smokeless tobacco: Never   Substance Use Topics    Alcohol use: Not Currently    Drug use: Never             2025   Breast Cancer Screening   Family history of breast, colon, or ovarian cancer? Yes         2025   LAST FHS-7 RESULTS   1st degree relative breast or ovarian cancer Yes   Any relative bilateral breast cancer Unknown   Any male have breast cancer Yes   Any ONE woman have BOTH breast AND ovarian cancer No   Any woman with breast cancer before 50yrs Yes   2 or more relatives with breast AND/OR ovarian cancer No   2 or more relatives with breast AND/OR bowel cancer No        Mammogram Screening - Mammogram every 1-2 years updated in Health Maintenance based on mutual decision making        2025   STI Screening   New sexual partner(s) since last STI/HIV test? No     History of abnormal Pap smear: no longer required       ASCVD Risk   The 10-year ASCVD risk score (Kirsten LEONE, et al., 2019) is: 13.6%    Values used to calculate the score:      Age: 65 years      Sex: Female      Is Non- : Yes      Diabetic: Yes      Tobacco smoker: No      Systolic Blood Pressure: 132 mmHg      Is BP treated: Yes      HDL Cholesterol: 58 mg/dL      Total Cholesterol: 132 mg/dL           Reviewed and updated as needed this visit by Provider                    Lab work is in process      Review of Systems  Constitutional, HEENT, cardiovascular, pulmonary, GI, , musculoskeletal, neuro, skin, endocrine and psych systems are negative, except as otherwise noted.     Objective   "  Exam  /70   Pulse 92   Ht 1.664 m (5' 5.5\")   Wt 92.5 kg (204 lb)   SpO2 98%   BMI 33.43 kg/m     Estimated body mass index is 33.43 kg/m  as calculated from the following:    Height as of this encounter: 1.664 m (5' 5.5\").    Weight as of this encounter: 92.5 kg (204 lb).    Physical Exam  GENERAL: alert and no distress  EYES: Eyes grossly normal to inspection, PERRL and conjunctivae and sclerae normal  HENT: ear canals and TM's normal, nose and mouth without ulcers or lesions  NECK: no adenopathy, no asymmetry, masses, or scars  RESP: lungs clear to auscultation - no rales, rhonchi or wheezes  BREAST: normal without masses, tenderness or nipple discharge and no palpable axillary masses or adenopathy  CV: regular rate and rhythm, normal S1 S2, no S3 or S4, no murmur, click or rub, no peripheral edema  ABDOMEN: soft, nontender, no hepatosplenomegaly, no masses and bowel sounds normal  MS: no gross musculoskeletal defects noted, no edema  SKIN: no suspicious lesions or rashes  NEURO: Normal strength and tone, mentation intact and speech normal  PSYCH: mentation appears normal, affect normal/bright        Signed Electronically by: Ted Saravia MD    Answers submitted by the patient for this visit:  Patient Health Questionnaire (Submitted on 2/12/2025)  If you checked off any problems, how difficult have these problems made it for you to do your work, take care of things at home, or get along with other people?: Not difficult at all  PHQ9 TOTAL SCORE: 5  Patient Health Questionnaire (G7) (Submitted on 2/12/2025)  NAZARIO 7 TOTAL SCORE: 5    "

## 2025-02-11 NOTE — PATIENT INSTRUCTIONS
Patient Education   Preventive Care Advice   This is general advice given by our system to help you stay healthy. However, your care team may have specific advice just for you. Please talk to your care team about your preventive care needs.  Nutrition  Eat 5 or more servings of fruits and vegetables each day.  Try wheat bread, brown rice and whole grain pasta (instead of white bread, rice, and pasta).  Get enough calcium and vitamin D. Check the label on foods and aim for 100% of the RDA (recommended daily allowance).  Lifestyle  Exercise at least 150 minutes each week  (30 minutes a day, 5 days a week).  Do muscle strengthening activities 2 days a week. These help control your weight and prevent disease.  No smoking.  Wear sunscreen to prevent skin cancer.  Have a dental exam and cleaning every 6 months.  Yearly exams  See your health care team every year to talk about:  Any changes in your health.  Any medicines your care team has prescribed.  Preventive care, family planning, and ways to prevent chronic diseases.  Shots (vaccines)   HPV shots (up to age 26), if you've never had them before.  Hepatitis B shots (up to age 59), if you've never had them before.  COVID-19 shot: Get this shot when it's due.  Flu shot: Get a flu shot every year.  Tetanus shot: Get a tetanus shot every 10 years.  Pneumococcal, hepatitis A, and RSV shots: Ask your care team if you need these based on your risk.  Shingles shot (for age 50 and up)  General health tests  Diabetes screening:  Starting at age 35, Get screened for diabetes at least every 3 years.  If you are younger than age 35, ask your care team if you should be screened for diabetes.  Cholesterol test: At age 39, start having a cholesterol test every 5 years, or more often if advised.  Bone density scan (DEXA): At age 50, ask your care team if you should have this scan for osteoporosis (brittle bones).  Hepatitis C: Get tested at least once in your life.  STIs (sexually  transmitted infections)  Before age 24: Ask your care team if you should be screened for STIs.  After age 24: Get screened for STIs if you're at risk. You are at risk for STIs (including HIV) if:  You are sexually active with more than one person.  You don't use condoms every time.  You or a partner was diagnosed with a sexually transmitted infection.  If you are at risk for HIV, ask about PrEP medicine to prevent HIV.  Get tested for HIV at least once in your life, whether you are at risk for HIV or not.  Cancer screening tests  Cervical cancer screening: If you have a cervix, begin getting regular cervical cancer screening tests starting at age 21.  Breast cancer scan (mammogram): If you've ever had breasts, begin having regular mammograms starting at age 40. This is a scan to check for breast cancer.  Colon cancer screening: It is important to start screening for colon cancer at age 45.  Have a colonoscopy test every 10 years (or more often if you're at risk) Or, ask your provider about stool tests like a FIT test every year or Cologuard test every 3 years.  To learn more about your testing options, visit:   .  For help making a decision, visit:   https://bit.ly/zx48699.  Prostate cancer screening test: If you have a prostate, ask your care team if a prostate cancer screening test (PSA) at age 55 is right for you.  Lung cancer screening: If you are a current or former smoker ages 50 to 80, ask your care team if ongoing lung cancer screenings are right for you.  For informational purposes only. Not to replace the advice of your health care provider. Copyright   2023 [facility]. All rights reserved. Clinically reviewed by the [facility]. [employer] [id number] - REV 01/24.  Preventing Falls: Care Instructions  Injuries and health problems such as trouble walking or poor eyesight can increase your risk of falling. So can some medicines. But there are things you can do to help  "prevent falls. You can exercise to get stronger. You can also arrange your home to make it safer.    Talk to your doctor about the medicines you take. Ask if any of them increase the risk of falls and whether they can be changed or stopped.   Try to exercise regularly. It can help improve your strength and balance. This can help lower your risk of falling.         Practice fall safety and prevention.   Wear low-heeled shoes that fit well and give your feet good support. Talk to your doctor if you have foot problems that make this hard.  Carry a cellphone or wear a medical alert device that you can use to call for help.  Use stepladders instead of chairs to reach high objects. Don't climb if you're at risk for falls. Ask for help, if needed.  Wear the correct eyeglasses, if you need them.        Make your home safer.   Remove rugs, cords, clutter, and furniture from walkways.  Keep your house well lit. Use night-lights in hallways and bathrooms.  Install and use sturdy handrails on stairways.  Wear nonskid footwear, even inside. Don't walk barefoot or in socks without shoes.        Be safe outside.   Use handrails, curb cuts, and ramps whenever possible.  Keep your hands free by using a shoulder bag or backpack.  Try to walk in well-lit areas. Watch out for uneven ground, changes in pavement, and debris.  Be careful in the winter. Walk on the grass or gravel when sidewalks are slippery. Use de-icer on steps and walkways. Add non-slip devices to shoes.    Put grab bars and nonskid mats in your shower or tub and near the toilet. Try to use a shower chair or bath bench when bathing.   Get into a tub or shower by putting in your weaker leg first. Get out with your strong side first. Have a phone or medical alert device in the bathroom with you.   Where can you learn more?  Go to https://www.[employer].net/patiented  Enter G117 in the search box to learn more about \"Preventing Falls: Care Instructions.\"  Current as of: " July 31, 2024  Content Version: 14.3    2024 [employer].   Care instructions adapted under license by your healthcare professional. If you have questions about a medical condition or this instruction, always ask your healthcare professional. [employer] disclaims any warranty or liability for your use of this information.    Learning About Stress  What is stress?     Stress is your body's response to a hard situation. Your body can have a physical, emotional, or mental response. Stress is a fact of life for most people, and it affects everyone differently. What causes stress for you may not be stressful for someone else.  A lot of things can cause stress. You may feel stress when you go on a job interview, take a test, or run a race. This kind of short-term stress is normal and even useful. It can help you if you need to work hard or react quickly. For example, stress can help you finish an important job on time.  Long-term stress is caused by ongoing stressful situations or events. Examples of long-term stress include long-term health problems, ongoing problems at work, or conflicts in your family. Long-term stress can harm your health.  How does stress affect your health?  When you are stressed, your body responds as though you are in danger. It makes hormones that speed up your heart, make you breathe faster, and give you a burst of energy. This is called the fight-or-flight stress response. If the stress is over quickly, your body goes back to normal and no harm is done.  But if stress happens too often or lasts too long, it can have bad effects. Long-term stress can make you more likely to get sick, and it can make symptoms of some diseases worse. If you tense up when you are stressed, you may develop neck, shoulder, or low back pain. Stress is linked to high blood pressure and heart disease.  Stress also harms your emotional health. It can make you jaffe, tense, or depressed. Your  relationships may suffer, and you may not do well at work or school.  What can you do to manage stress?  You can try these things to help manage stress:   Do something active. Exercise or activity can help reduce stress. Walking is a great way to get started. Even everyday activities such as housecleaning or yard work can help.  Try yoga or frances chi. These techniques combine exercise and meditation. You may need some training at first to learn them.  Do something you enjoy. For example, listen to music or go to a movie. Practice your hobby or do volunteer work.  Meditate. This can help you relax, because you are not worrying about what happened before or what may happen in the future.  Do guided imagery. Imagine yourself in any setting that helps you feel calm. You can use online videos, books, or a teacher to guide you.  Do breathing exercises. For example:  From a standing position, bend forward from the waist with your knees slightly bent. Let your arms dangle close to the floor.  Breathe in slowly and deeply as you return to a standing position. Roll up slowly and lift your head last.  Hold your breath for just a few seconds in the standing position.  Breathe out slowly and bend forward from the waist.  Let your feelings out. Talk, laugh, cry, and express anger when you need to. Talking with supportive friends or family, a counselor, or a anthony leader about your feelings is a healthy way to relieve stress. Avoid discussing your feelings with people who make you feel worse.  Write. It may help to write about things that are bothering you. This helps you find out how much stress you feel and what is causing it. When you know this, you can find better ways to cope.  What can you do to prevent stress?  You might try some of these things to help prevent stress:  Manage your time. This helps you find time to do the things you want and need to do.  Get enough sleep. Your body recovers from the stresses of the day while  "you are sleeping.  Get support. Your family, friends, and community can make a difference in how you experience stress.  Limit your news feed. Avoid or limit time on social media or news that may make you feel stressed.  Do something active. Exercise or activity can help reduce stress. Walking is a great way to get started.  Where can you learn more?  Go to https://www.[employer].net/patiented  Enter N032 in the search box to learn more about \"Learning About Stress.\"  Current as of: October 24, 2023  Content Version: 14.3    2024 [employer].   Care instructions adapted under license by your healthcare professional. If you have questions about a medical condition or this instruction, always ask your healthcare professional. [employer] disclaims any warranty or liability for your use of this information.       "

## 2025-02-12 ENCOUNTER — OFFICE VISIT (OUTPATIENT)
Dept: FAMILY MEDICINE | Facility: CLINIC | Age: 65
End: 2025-02-12

## 2025-02-12 VITALS
DIASTOLIC BLOOD PRESSURE: 70 MMHG | SYSTOLIC BLOOD PRESSURE: 132 MMHG | WEIGHT: 204 LBS | HEART RATE: 92 BPM | BODY MASS INDEX: 32.78 KG/M2 | HEIGHT: 66 IN | OXYGEN SATURATION: 98 %

## 2025-02-12 DIAGNOSIS — L65.9 HAIR LOSS: ICD-10-CM

## 2025-02-12 DIAGNOSIS — I10 BENIGN ESSENTIAL HYPERTENSION: ICD-10-CM

## 2025-02-12 DIAGNOSIS — E78.00 HYPERCHOLESTEREMIA: ICD-10-CM

## 2025-02-12 DIAGNOSIS — Z90.710 H/O HYSTERECTOMY FOR BENIGN DISEASE: ICD-10-CM

## 2025-02-12 DIAGNOSIS — Z87.891 SMOKING HISTORY: ICD-10-CM

## 2025-02-12 DIAGNOSIS — E21.1 HYPERPARATHYROIDISM DUE TO VITAMIN D DEFICIENCY: ICD-10-CM

## 2025-02-12 DIAGNOSIS — E55.9 VITAMIN D DEFICIENCY: ICD-10-CM

## 2025-02-12 DIAGNOSIS — F32.1 MODERATE SINGLE CURRENT EPISODE OF MAJOR DEPRESSIVE DISORDER (H): ICD-10-CM

## 2025-02-12 DIAGNOSIS — Z00.00 ROUTINE GENERAL MEDICAL EXAMINATION AT A HEALTH CARE FACILITY: Primary | ICD-10-CM

## 2025-02-12 DIAGNOSIS — E11.65 TYPE 2 DIABETES MELLITUS WITH HYPERGLYCEMIA, WITHOUT LONG-TERM CURRENT USE OF INSULIN (H): ICD-10-CM

## 2025-02-12 DIAGNOSIS — Z12.31 ENCOUNTER FOR SCREENING MAMMOGRAM FOR MALIGNANT NEOPLASM OF BREAST: ICD-10-CM

## 2025-02-12 PROBLEM — M54.50 CHRONIC LOW BACK PAIN: Status: ACTIVE | Noted: 2024-12-12

## 2025-02-12 PROBLEM — G89.29 CHRONIC LOW BACK PAIN: Status: ACTIVE | Noted: 2024-12-12

## 2025-02-12 LAB
% GRANULOCYTES: 60.3 % (ref 42.2–75.2)
ALBUMIN SERPL BCG-MCNC: 4 G/DL (ref 3.5–5.2)
ALP SERPL-CCNC: 83 U/L (ref 40–150)
ALT SERPL W P-5'-P-CCNC: 23 U/L (ref 0–50)
ANION GAP SERPL CALCULATED.3IONS-SCNC: 10 MMOL/L (ref 7–15)
AST SERPL W P-5'-P-CCNC: 15 U/L (ref 0–45)
BILIRUB SERPL-MCNC: 0.2 MG/DL
BUN SERPL-MCNC: 11.4 MG/DL (ref 8–23)
CALCIUM SERPL-MCNC: 10.1 MG/DL (ref 8.8–10.4)
CHLORIDE SERPL-SCNC: 101 MMOL/L (ref 98–107)
CHOLESTEROL: 191 MG/DL (ref 100–199)
CREAT SERPL-MCNC: 0.64 MG/DL (ref 0.51–0.95)
EGFRCR SERPLBLD CKD-EPI 2021: >90 ML/MIN/1.73M2
FASTING STATUS PATIENT QL REPORTED: NO
FASTING?: NO
GLUCOSE SERPL-MCNC: 311 MG/DL (ref 70–99)
HBA1C MFR BLD: 13.1 % (ref 4–6)
HCO3 SERPL-SCNC: 26 MMOL/L (ref 22–29)
HCT VFR BLD AUTO: 38.1 % (ref 35–46)
HDL (RMG): 70 MG/DL (ref 40–?)
HEMOGLOBIN: 12.5 G/DL (ref 11.8–15.5)
LDL CALCULATED (RMG): 70 MG/DL (ref 0–130)
LYMPHOCYTES NFR BLD AUTO: 31.2 % (ref 20.5–51.1)
MCH RBC QN AUTO: 27.7 PG (ref 27–31)
MCHC RBC AUTO-ENTMCNC: 32.9 G/DL (ref 33–37)
MCV RBC AUTO: 84.2 FL (ref 80–100)
MONOCYTES NFR BLD AUTO: 8.5 % (ref 1.7–9.3)
PLATELET # BLD AUTO: 379 K/UL (ref 140–450)
POTASSIUM SERPL-SCNC: 4.1 MMOL/L (ref 3.4–5.3)
PROT SERPL-MCNC: 7.9 G/DL (ref 6.4–8.3)
RBC # BLD AUTO: 4.53 X10/CMM (ref 3.7–5.2)
SODIUM SERPL-SCNC: 137 MMOL/L (ref 135–145)
TRIGLYCERIDES (RMG): 253 MG/DL (ref 0–149)
TSH SERPL DL<=0.005 MIU/L-ACNC: 1.16 UIU/ML (ref 0.3–4.2)
WBC # BLD AUTO: 4.3 X10/CMM (ref 3.8–11)

## 2025-02-12 PROCEDURE — 85025 COMPLETE CBC W/AUTO DIFF WBC: CPT | Performed by: FAMILY MEDICINE

## 2025-02-12 PROCEDURE — 84443 ASSAY THYROID STIM HORMONE: CPT | Performed by: FAMILY MEDICINE

## 2025-02-12 PROCEDURE — 90480 ADMN SARSCOV2 VAC 1/ONLY CMP: CPT | Performed by: FAMILY MEDICINE

## 2025-02-12 PROCEDURE — 80061 LIPID PANEL: CPT | Mod: QW | Performed by: FAMILY MEDICINE

## 2025-02-12 PROCEDURE — 84460 ALANINE AMINO (ALT) (SGPT): CPT | Performed by: FAMILY MEDICINE

## 2025-02-12 PROCEDURE — 80053 COMPREHEN METABOLIC PANEL: CPT | Performed by: FAMILY MEDICINE

## 2025-02-12 PROCEDURE — 82310 ASSAY OF CALCIUM: CPT | Performed by: FAMILY MEDICINE

## 2025-02-12 PROCEDURE — 91320 SARSCV2 VAC 30MCG TRS-SUC IM: CPT | Performed by: FAMILY MEDICINE

## 2025-02-12 PROCEDURE — 36415 COLL VENOUS BLD VENIPUNCTURE: CPT | Performed by: FAMILY MEDICINE

## 2025-02-12 PROCEDURE — 99213 OFFICE O/P EST LOW 20 MIN: CPT | Mod: 25 | Performed by: FAMILY MEDICINE

## 2025-02-12 PROCEDURE — 83036 HEMOGLOBIN GLYCOSYLATED A1C: CPT | Performed by: FAMILY MEDICINE

## 2025-02-12 PROCEDURE — 99396 PREV VISIT EST AGE 40-64: CPT | Mod: 25 | Performed by: FAMILY MEDICINE

## 2025-02-12 PROCEDURE — 84443 ASSAY THYROID STIM HORMONE: CPT | Mod: 90 | Performed by: FAMILY MEDICINE

## 2025-02-12 RX ORDER — SEMAGLUTIDE 0.68 MG/ML
0.25 INJECTION, SOLUTION SUBCUTANEOUS
Qty: 3 ML | Refills: 3 | Status: SHIPPED | OUTPATIENT
Start: 2025-02-12

## 2025-02-12 RX ORDER — LISINOPRIL 10 MG/1
20 TABLET ORAL DAILY
Qty: 90 TABLET | Refills: 3 | Status: SHIPPED | OUTPATIENT
Start: 2025-02-12

## 2025-02-12 RX ORDER — ROSUVASTATIN CALCIUM 20 MG/1
20 TABLET, COATED ORAL DAILY
Qty: 90 TABLET | Refills: 1 | Status: SHIPPED | OUTPATIENT
Start: 2025-02-12

## 2025-02-12 ASSESSMENT — ANXIETY QUESTIONNAIRES
7. FEELING AFRAID AS IF SOMETHING AWFUL MIGHT HAPPEN: SEVERAL DAYS
8. IF YOU CHECKED OFF ANY PROBLEMS, HOW DIFFICULT HAVE THESE MADE IT FOR YOU TO DO YOUR WORK, TAKE CARE OF THINGS AT HOME, OR GET ALONG WITH OTHER PEOPLE?: NOT DIFFICULT AT ALL
GAD7 TOTAL SCORE: 5
1. FEELING NERVOUS, ANXIOUS, OR ON EDGE: SEVERAL DAYS
GAD7 TOTAL SCORE: 5
4. TROUBLE RELAXING: NOT AT ALL
GAD7 TOTAL SCORE: 5
5. BEING SO RESTLESS THAT IT IS HARD TO SIT STILL: NOT AT ALL
3. WORRYING TOO MUCH ABOUT DIFFERENT THINGS: SEVERAL DAYS
IF YOU CHECKED OFF ANY PROBLEMS ON THIS QUESTIONNAIRE, HOW DIFFICULT HAVE THESE PROBLEMS MADE IT FOR YOU TO DO YOUR WORK, TAKE CARE OF THINGS AT HOME, OR GET ALONG WITH OTHER PEOPLE: NOT DIFFICULT AT ALL
7. FEELING AFRAID AS IF SOMETHING AWFUL MIGHT HAPPEN: SEVERAL DAYS
6. BECOMING EASILY ANNOYED OR IRRITABLE: SEVERAL DAYS
2. NOT BEING ABLE TO STOP OR CONTROL WORRYING: SEVERAL DAYS

## 2025-02-12 ASSESSMENT — PATIENT HEALTH QUESTIONNAIRE - PHQ9
SUM OF ALL RESPONSES TO PHQ QUESTIONS 1-9: 5
10. IF YOU CHECKED OFF ANY PROBLEMS, HOW DIFFICULT HAVE THESE PROBLEMS MADE IT FOR YOU TO DO YOUR WORK, TAKE CARE OF THINGS AT HOME, OR GET ALONG WITH OTHER PEOPLE: NOT DIFFICULT AT ALL
SUM OF ALL RESPONSES TO PHQ QUESTIONS 1-9: 5

## 2025-02-12 NOTE — LETTER
February 13, 2025      Kelly Crews  [address]  [place] [id number]              Dear Kelly,     I am writing to report that your included test results are as expected.    Many labs contain some results that are slightly outside of the normal range, I have reviewed any of these results and they require no changes at this time.     Resulted Orders   Lipid Profile ([facility])   Result Value Ref Range    Cholesterol 191 100 - 199 mg/dL    HDL 70 >=40 mg/dL    Triglycerides 253 (A) 0 - 149 mg/dL    LDL CALCULATED ([facility]) 70 0 - 130 mg/dL    Patient Fasting? No    CBC with Diff/Plt ([facility])   Result Value Ref Range    WBC x10/cmm 4.3 3.8 - 11.0 x10/cmm    % Lymphocytes 31.2 20.5 - 51.1 %    % Monocytes 8.5 1.7 - 9.3 %    % Granulocytes 60.3 42.2 - 75.2 %    RBC x10/cmm 4.53 3.7 - 5.2 x10/cmm    Hemoglobin 12.5 11.8 - 15.5 g/dl    Hematocrit 38.1 35 - 46 %    MCV 84.2 80 - 100 fL    MCH 27.7 27.0 - 31.0 pg    MCHC 32.9 (A) 33.0 - 37.0 g/dL    Platelet Count 379 140 - 450 K/uL   Hemoglobin A1C (G)   Result Value Ref Range    Hemoglobin A1C 13.1 (A) 4.0 - 6.0 %   Comprehensive metabolic panel   Result Value Ref Range    Sodium 137 135 - 145 mmol/L    Potassium 4.1 3.4 - 5.3 mmol/L    Carbon Dioxide (CO2) 26 22 - 29 mmol/L    Anion Gap 10 7 - 15 mmol/L    Urea Nitrogen 11.4 8.0 - 23.0 mg/dL    Creatinine 0.64 0.51 - 0.95 mg/dL    GFR Estimate >90 >60 mL/min/1.73m2      Comment:      eGFR calculated using 2021 CKD-EPI equation.    Calcium 10.1 8.8 - 10.4 mg/dL    Chloride 101 98 - 107 mmol/L    Glucose 311 (H) 70 - 99 mg/dL    Alkaline Phosphatase 83 40 - 150 U/L    AST 15 0 - 45 U/L    ALT 23 0 - 50 U/L    Protein Total 7.9 6.4 - 8.3 g/dL    Albumin 4.0 3.5 - 5.2 g/dL    Bilirubin Total 0.2 <=1.2 mg/dL    Patient Fasting > 8hrs? No    TSH   Result Value Ref Range    TSH 1.16 0.30 - 4.20 uIU/mL       If you have any questions or concerns, please call the clinic at the number listed above.       Sincerely,      Ted Castillo  MD Manjit    Electronically signed

## 2025-02-13 ENCOUNTER — PATIENT OUTREACH (OUTPATIENT)
Dept: CARE COORDINATION | Facility: CLINIC | Age: 65
End: 2025-02-13
Payer: COMMERCIAL

## 2025-02-13 NOTE — RESULT ENCOUNTER NOTE
Dear Kelly,  Here is a copy of your labs, we will discuss them at your upcoming visit.  Ted Saravia MD

## 2025-02-13 NOTE — RESULT ENCOUNTER NOTE
Dear Kelly,     I am writing to report that your included test results are as expected.    Many labs contain some results that are slightly outside of the normal range, I have reviewed any of these results and they require no changes at this time.    Ted Saravia MD

## 2025-02-19 ENCOUNTER — TELEPHONE (OUTPATIENT)
Dept: FAMILY MEDICINE | Facility: CLINIC | Age: 65
End: 2025-02-19

## 2025-02-19 DIAGNOSIS — E11.65 TYPE 2 DIABETES MELLITUS WITH HYPERGLYCEMIA, WITHOUT LONG-TERM CURRENT USE OF INSULIN (H): ICD-10-CM

## 2025-02-19 RX ORDER — SEMAGLUTIDE 0.68 MG/ML
0.25 INJECTION, SOLUTION SUBCUTANEOUS
Qty: 9 ML | Refills: 1 | Status: SHIPPED | OUTPATIENT
Start: 2025-02-19

## 2025-02-20 NOTE — TELEPHONE ENCOUNTER
"Patient called clinic today regarding Ozempic and Jardiance prescribed at 2/12/25 visit with Dr. Saravia.     [facility] pharmacy charged her \"the full amount\" for these meds because only ordered 30 days. [facility] told her if fills 90 days the price will go down.   Nurse called pharmacy - tech reports recalls her pharmacist speaking with patient regarding insurance wants 90 day prescription.     Per Dr. Saravia's 2/12/25 visit note, patient had been off meds and DM is out of control. Plan was to restart meds and recheck in office in 3 months.   Hemoglobin A1C   Date Value Ref Range Status   02/12/2025 13.1 (A) 4.0 - 6.0 % Final   07/25/2023 10.3 (A) 4.0 - 6.0 % Final   02/07/2023 11.8 (A) 4.0 - 6.0 % Final      Plan: okay per Dr. Saravia to send Jardiance and Ozempic rx's for 90 days. Rx's sent. Patient informed.  Ashlee Foster RN    "

## 2025-03-12 ENCOUNTER — ANCILLARY PROCEDURE (OUTPATIENT)
Dept: CT IMAGING | Facility: CLINIC | Age: 65
End: 2025-03-12
Attending: FAMILY MEDICINE
Payer: COMMERCIAL

## 2025-03-12 DIAGNOSIS — Z87.891 SMOKING HISTORY: ICD-10-CM

## 2025-03-12 PROCEDURE — 71271 CT THORAX LUNG CANCER SCR C-: CPT

## 2025-03-12 NOTE — LETTER
March 14, 2025      Kelly Crews  [address]  [facility] [address]            Dear Kelly,     I am writing to report that your included test results are as expected.    Many labs contain some results that are slightly outside of the normal range, I have reviewed any of these results and they require no changes at this time       Resulted Orders   CT Chest Lung Cancer Screen Low Dose Without    Narrative    EXAM: LOW DOSE LUNG CANCER SCREENING CT CHEST  LOCATION: [facility]  DATE: 3/12/2025    INDICATION: Lung cancer screening. History of smoking. High risk patient.  COMPARISON: 4/11/2023    TECHNIQUE: Low-dose lung cancer screening non-contrast CT chest. Dose reduction techniques were used. Images assessed using LungRADS 2022 criteria.    FINDINGS:  NODULES: Stable 4 mm left apical solid nodule (series 4, image 36). Another 5 x 3 mm central left upper lobe nodule (image 112) and a 3 mm subpleural right lower lobe nodule (image 199) are stable. No new pulmonary nodule.    LUNGS AND PLEURA: Patent airways. No substantial lung emphysematous changes. Stable scattered linear scars in both lungs. No evidence of interstitial fibrosis, acute airspace opacity, or pleural effusion.    MEDIASTINUM: Normal.    CORONARY ARTERY CALCIFICATION: None.    LIMITED UPPER ABDOMEN: No actionable finding.    MUSCULOSKELETAL: No aggressive osseous lesion.      Impression    IMPRESSION:  1.  Negative for lung cancer screening purposes.  2.  No acute airspace opacity.    LungRADS CATEGORY: 2 : Benign.  -Perifissural nodule(s): <10 mm  -Solid nodule(s): <6 mm on baseline screening; or new nodule <4 mm  -Subsolid nodule(s): <6 mm on baseline screening  -Ground glass nodule(s): <30 mm at baseline, new or slowly growing; 30 mm or larger and unchanged or slowly growing  -Category 3 nodules that are unchanged for greater than or equal to 6 months  -Category 4 lesions proven to be benign following appropriate  workup  -Airway nodule, subsegmental at baseline, new or stable    RADIOLOGIST RECOMMENDATION(S): Continue annual screening with low-dose CT chest in 12 months.       If you have any questions or concerns, please call the clinic at the number listed above.       Sincerely,      Ted Saravia MD    Electronically signed

## 2025-03-20 ENCOUNTER — HOSPITAL ENCOUNTER (OUTPATIENT)
Dept: MAMMOGRAPHY | Facility: CLINIC | Age: 65
Discharge: HOME | End: 2025-03-20
Attending: FAMILY MEDICINE
Payer: COMMERCIAL

## 2025-03-20 DIAGNOSIS — Z12.31 ENCOUNTER FOR SCREENING MAMMOGRAM FOR MALIGNANT NEOPLASM OF BREAST: ICD-10-CM

## 2025-03-20 PROCEDURE — 77063 BREAST TOMOSYNTHESIS BI: CPT

## 2025-04-03 ENCOUNTER — HOSPITAL ENCOUNTER (EMERGENCY)
Facility: CLINIC | Age: 65
Discharge: HOME | End: 2025-04-03
Attending: EMERGENCY MEDICINE
Payer: COMMERCIAL

## 2025-04-03 ENCOUNTER — APPOINTMENT (OUTPATIENT)
Dept: GENERAL RADIOLOGY | Facility: CLINIC | Age: 65
End: 2025-04-03
Attending: EMERGENCY MEDICINE
Payer: COMMERCIAL

## 2025-04-03 VITALS
OXYGEN SATURATION: 98 % | SYSTOLIC BLOOD PRESSURE: 129 MMHG | DIASTOLIC BLOOD PRESSURE: 83 MMHG | RESPIRATION RATE: 16 BRPM | TEMPERATURE: 97.9 F | HEART RATE: 93 BPM

## 2025-04-03 DIAGNOSIS — M25.461 EFFUSION OF RIGHT KNEE: ICD-10-CM
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
## 2025-04-03 DIAGNOSIS — S40.011A CONTUSION OF MULTIPLE SITES OF SHOULDER, RIGHT, INITIAL ENCOUNTER: ICD-10-CM
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
## 2025-04-03 PROCEDURE — 73562 X-RAY EXAM OF KNEE 3: CPT | Mod: RT

## 2025-04-03 PROCEDURE — 72170 X-RAY EXAM OF PELVIS: CPT

## 2025-04-03 PROCEDURE — 73030 X-RAY EXAM OF SHOULDER: CPT | Mod: RT

## 2025-04-03 PROCEDURE — 250N000013 HC RX MED GY IP 250 OP 250 PS 637: Performed by: EMERGENCY MEDICINE

## 2025-04-03 PROCEDURE — 99285 EMERGENCY DEPT VISIT HI MDM: CPT

## 2025-04-03 RX ORDER — IBUPROFEN 600 MG/1
600 TABLET, FILM COATED ORAL ONCE
Status: COMPLETED | OUTPATIENT
Start: 2025-04-03 | End: 2025-04-03

## 2025-04-03 RX ADMIN — IBUPROFEN 600 MG: 600 TABLET ORAL at 15:32

## 2025-04-03 ASSESSMENT — ACTIVITIES OF DAILY LIVING (ADL)
ADLS_ACUITY_SCORE: 41

## 2025-04-03 NOTE — ED PROVIDER NOTES
"  Emergency Department Note      History of Present Illness     Chief Complaint   Fall      HPI   Kelly Pineda is a 65 year old female with history of *** who presents to the ED via***with*** for evaluation of a fall. The patient reports that she slipped outside last night around 10 PM and fell on her right side. She endorses right knee swelling and right shoulder pain. Denies loss of consciousness. No blood thinner use.    Independent Historian   None    Review of External Notes   ***    Past Medical History     Medical History and Problem List   Past Medical History:   Diagnosis Date    Diabetes mellitus (H)        Medications   blood glucose (NO BRAND SPECIFIED) lancets standard  blood glucose (NO BRAND SPECIFIED) test strip  Blood Glucose Monitoring Suppl (BLOOD GLUCOSE MONITOR SYSTEM) w/Device KIT  empagliflozin (JARDIANCE) 10 MG TABS tablet  lisinopril (ZESTRIL) 10 MG tablet  rosuvastatin (CRESTOR) 20 MG tablet  semaglutide (OZEMPIC, 0.25 OR 0.5 MG/DOSE,) 2 MG/3ML pen        Surgical History   Past Surgical History:   Procedure Laterality Date    BREAST BIOPSY, RT/LT  2002    benign    HYSTERECTOMY RADICAL, SALPINGO-OOPHORECTOMY  2005    cervix removed, done for severe fibroids, paps no longer required       Physical Exam     Patient Vitals for the past 24 hrs:   BP Temp Temp src Pulse Resp SpO2   04/03/25 1422 -- 97.9  F (36.6  C) Temporal -- -- --   04/03/25 1421 129/83 -- -- -- -- --   04/03/25 1419 -- -- -- 93 16 98 %     Physical Exam  ***    Diagnostics     Lab Results   Labs Ordered and Resulted from Time of ED Arrival to Time of ED Departure - No data to display    Imaging   No orders to display       EKG   ECG taken at ***, ECG read at ***  ***   *** as compared to prior, dated ***/***/***.  Rate *** bpm. DE interval *** ms. QRS duration *** ms. QT/QTc ***/*** ms. P-R-T axes *** *** ***.    Independent Interpretation   {IndependentReview:988859::\"None\"}    ED Course      Medications Administered " "  Medications - No data to display    Procedures   Procedures     Discussion of Management   {Consults/Care Discussions:424618::\"None\"}    ED Course   ED Course as of 04/03/25 1523   Thu Apr 03, 2025   1523 I obtained history and examined the patient as noted above.        Additional Documentation  {EPPAAdditionalPhrase:778944::\"None\"}    Medical Decision Making / Diagnosis     CMS Diagnoses: {Sepsis/Septic Shock/Stemi/Stroke:489174::\"None\"}    MIPS   {EPPA MIPS:354817::\"None\"}    RENA Pineda is a 65 year old female ***    Disposition   {EPPAFV Dispo:419383}    Diagnosis   No diagnosis found.     Discharge Medications   New Prescriptions    No medications on file         Scribe Disclosure:  I, Juan Reid, am serving as a scribe at 3:17 PM on 4/3/2025 to document services personally performed by Alok Rick MD based on my observations and the provider's statements to me.     " serving as a scribe at 3:17 PM on 4/3/2025 to document services personally performed by Alok Rick MD based on my observations and the provider's statements to me.        Alok Rick MD  04/07/25 0131

## 2025-04-03 NOTE — ED TRIAGE NOTES
Had a fall yesterday outside slipping on the ice/felt her knee buckle underneath her- denies loss of consciousness. Denies hitting her head.   Fell onto her right side, now complaining of right shoulder, hip, and knee pain.      Triage Assessment (Adult)       Row Name 04/03/25 0959          Triage Assessment    Airway WDL WDL        Respiratory WDL    Respiratory WDL WDL        Skin Circulation/Temperature WDL    Skin Circulation/Temperature WDL WDL        Cardiac WDL    Cardiac WDL WDL        Peripheral/Neurovascular WDL    Peripheral Neurovascular WDL WDL        Cognitive/Neuro/Behavioral WDL    Cognitive/Neuro/Behavioral WDL WDL

## 2025-04-03 NOTE — DISCHARGE INSTRUCTIONS
Your x-rays today confirm no fracture or dislocation of the shoulder and no bony injury to the knee.  You do have some fluid in the knee joint.  Please use ice and elevation and compression.  If his symptoms do not resolve please follow-up with orthopedics for reassessment of the ligaments of the shoulder or the knee.  Thanks for your patience tonight and was sorry for your accidental fall.

## 2025-04-09 ENCOUNTER — OFFICE VISIT (OUTPATIENT)
Dept: FAMILY MEDICINE | Facility: CLINIC | Age: 65
End: 2025-04-09

## 2025-04-09 VITALS
DIASTOLIC BLOOD PRESSURE: 63 MMHG | OXYGEN SATURATION: 97 % | SYSTOLIC BLOOD PRESSURE: 116 MMHG | BODY MASS INDEX: 32.02 KG/M2 | WEIGHT: 195.4 LBS | HEART RATE: 111 BPM

## 2025-04-09 DIAGNOSIS — J44.9 CHRONIC OBSTRUCTIVE PULMONARY DISEASE, UNSPECIFIED COPD TYPE (H): Primary | ICD-10-CM

## 2025-04-09 DIAGNOSIS — S43.401D SPRAIN OF RIGHT SHOULDER, UNSPECIFIED SHOULDER SPRAIN TYPE, SUBSEQUENT ENCOUNTER: ICD-10-CM

## 2025-04-09 DIAGNOSIS — S83.501D SPRAIN OF CRUCIATE LIGAMENT OF RIGHT KNEE, SUBSEQUENT ENCOUNTER: ICD-10-CM

## 2025-04-09 PROCEDURE — 3078F DIAST BP <80 MM HG: CPT | Performed by: FAMILY MEDICINE

## 2025-04-09 PROCEDURE — 3074F SYST BP LT 130 MM HG: CPT | Performed by: FAMILY MEDICINE

## 2025-04-09 PROCEDURE — 99213 OFFICE O/P EST LOW 20 MIN: CPT | Performed by: FAMILY MEDICINE

## 2025-04-09 PROCEDURE — G2211 COMPLEX E/M VISIT ADD ON: HCPCS | Performed by: FAMILY MEDICINE

## 2025-04-09 NOTE — PROGRESS NOTES
Here to follow up on the recent ER visit    ROS:  General and Resp. completed and negative except as noted above    Histories: reviewed    OBJECTIVE:                                                    /63   Pulse 111   Wt 88.6 kg (195 lb 6.4 oz)   SpO2 97%   BMI 32.02 kg/m    Body mass index is 32.02 kg/m .   APPEARANCE: = Relaxed and in no distress  Conj/Eyelids = noninjected and lids and lashes are without inflammation  Ears/Nose = External structures and Nares have usual shape and form  Lips/Teeth/Gums = No lesions seen, good dentition, and gums seem healthy  Thyroid = Not enlarged and no masses felt  Resp effort =  no current breathing   Breath Sounds =  distant breath sounds  Musculsktl: extremities normal- no gross deformities noted and decreased range of motion in the shoulder and hip     ASSESSMENT/PLAN:                                                    Quality gaps reviewed    Kelly was seen today for er f/u and consult.    Diagnoses and all orders for this visit:    Chronic obstructive pulmonary disease, unspecified COPD type (H)  Due for spirometry  -     COPD ACTION PLAN  -     Spirometry, Breathing Capacity, Normal Order, Clinic Performed; Future    Sprain of right shoulder, unspecified shoulder sprain type, subsequent encounter  -     Physical Therapy  Referral; Future    Sprain of cruciate ligament of right knee, subsequent encounter        Work on weight loss  Regular exercise    Health maintenance items are reviewed in Epic and correct as of today:    Ted Saravia MD  [facility]  [facility]  [phone]    For any issues my office # is [phone]      The longitudinal plan of care for the diagnosis(es)/condition(s) as documented were addressed during this visit. Due to the added complexity in care, I will continue to support Kelly in the subsequent management and with ongoing continuity of care.  Answers submitted by the  patient for this visit:  General Questionnaire (Submitted on 4/9/2025)  Chief Complaint: Chronic problems general questions HPI Form  How many days per week do you miss taking your medication?: 0  General Concern (Submitted on 4/9/2025)  Chief Complaint: Chronic problems general questions HPI Form  What is the reason for your visit today?: Right knee and right shoulder  When did your symptoms begin?: 3-7 days ago  What are your symptoms?: Knee buckling, limited mobility in right arm and shoulder pain  How would you describe these symptoms?: Severe  Are your symptoms:: Staying the same  Have you had these symptoms before?: Yes  Have you tried or received treatment for these symptoms before?: Yes  Did that treatment work? : No  Is there anything that makes you feel worse?: Moving arm and sometimes walking  Is there anything that makes you feel better?: Sometimes massaging knee but pain comes back Sometimes  Questionnaire about: Chronic problems general questions HPI Form (Submitted on 4/9/2025)  Chief Complaint: Chronic problems general questions HPI Form

## 2025-04-09 NOTE — NURSING NOTE
Durable Medical Equipment given to Kelly Crews 1960 per Dr. Saravia.    Insurance checked in Epic: Yes. Patient signed form: Yes.  DME ordered correctly thru SmartSets: Yes. DME written on reorder form: Yes.    Face Sheet, DME Order, Office Visit Note, & Signed Form: Stapled together and put in basket for pickup.       Adebayo Purcell MA  April 9, 2025

## 2025-05-12 ENCOUNTER — TRANSFERRED RECORDS (OUTPATIENT)
Dept: FAMILY MEDICINE | Facility: CLINIC | Age: 65
End: 2025-05-12

## 2025-05-12 ENCOUNTER — OFFICE VISIT (OUTPATIENT)
Dept: FAMILY MEDICINE | Facility: CLINIC | Age: 65
End: 2025-05-12

## 2025-05-12 VITALS
HEART RATE: 96 BPM | DIASTOLIC BLOOD PRESSURE: 96 MMHG | WEIGHT: 198 LBS | SYSTOLIC BLOOD PRESSURE: 143 MMHG | OXYGEN SATURATION: 100 % | BODY MASS INDEX: 32.45 KG/M2

## 2025-05-12 DIAGNOSIS — E11.41 DIABETIC MONONEUROPATHY ASSOCIATED WITH TYPE 2 DIABETES MELLITUS (H): ICD-10-CM

## 2025-05-12 DIAGNOSIS — E11.65 TYPE 2 DIABETES MELLITUS WITH HYPERGLYCEMIA, WITHOUT LONG-TERM CURRENT USE OF INSULIN (H): Primary | ICD-10-CM

## 2025-05-12 DIAGNOSIS — J44.9 CHRONIC OBSTRUCTIVE PULMONARY DISEASE, UNSPECIFIED COPD TYPE (H): ICD-10-CM

## 2025-05-12 LAB
HBA1C MFR BLD: 9.7 % (ref 4–6)
RETINOPATHY: NORMAL

## 2025-05-12 PROCEDURE — 99214 OFFICE O/P EST MOD 30 MIN: CPT | Mod: 25 | Performed by: FAMILY MEDICINE

## 2025-05-12 PROCEDURE — 83036 HEMOGLOBIN GLYCOSYLATED A1C: CPT | Performed by: FAMILY MEDICINE

## 2025-05-12 PROCEDURE — 36415 COLL VENOUS BLD VENIPUNCTURE: CPT | Performed by: FAMILY MEDICINE

## 2025-05-12 PROCEDURE — 3077F SYST BP >= 140 MM HG: CPT | Performed by: FAMILY MEDICINE

## 2025-05-12 PROCEDURE — 3080F DIAST BP >= 90 MM HG: CPT | Performed by: FAMILY MEDICINE

## 2025-05-12 PROCEDURE — 92227 IMG RTA DETCJ/MNTR DS STAFF: CPT | Performed by: FAMILY MEDICINE

## 2025-05-12 NOTE — NURSING NOTE
RetinaVue ordered correctly in Epic today for Kelly Crews  1960 per Dr. Saravia.     RetinuaVue successful and sent..    Adriana Alatorre, [facility]  May 12, 2025

## 2025-05-12 NOTE — PROGRESS NOTES
Here to follow up on the diabetes.Answers submitted by the patient for this visit:  Back Pain Visit Questionnaire (Submitted on 5/5/2025)  Your back pain is: recurring  Chronic or Recurring Back Pain Visit Questionnaire (Submitted on 5/5/2025)  Where is your back pain located? : right lower back, left lower back, right hip, left hip  How would you describe your back pain? : cramping, sharp, shooting, stabbing  Where does your back pain spread? : nowhere  Since you noticed your back pain, how has it changed? : unchanged  Does your back pain interfere with your job?: Yes  General Questionnaire (Submitted on 5/5/2025)  Chief Complaint: Chronic problems general questions HPI Form  What is the reason for your visit today? : Check up  How many servings of fruits and vegetables do you eat daily?: 2-3  On average, how many sweetened beverages do you drink each day (Examples: soda, juice, sweet tea, etc.  Do NOT count diet or artificially sweetened beverages)?: 0  How many minutes a day do you exercise enough to make your heart beat faster?: 9 or less  How many days a week do you exercise enough to make your heart beat faster?: 3 or less  How many days per week do you miss taking your medication?: 0  Questionnaire about: Chronic problems general questions HPI Form (Submitted on 5/5/2025)  Chief Complaint: Chronic problems general questions HPI Form  Diabetes  she  reports Medication compliance:  compliant most of the time, Diabetic diet compliance:  compliant most of the time, Home glucose monitoring:  are performed regulary  Medications: see list  Home blood sugar monitoring: are performed regulary  As a direct cause of their history of diabetes they have the following Diabetic complications: peripheral neuropathy    Most  recent A1C: Smoking: BP:   Lab Results   Component Value Date    A1C 9.7 05/12/2025    A1C 13.1 02/12/2025    A1C 10.3 07/25/2023    History   Smoking Status    Former    Types: Cigarettes   Smokeless  "Tobacco    Never    BP Readings from Last 1 Encounters:   05/12/25 (!) 143/96        Kidney studies:  Creatinine   Date Value Ref Range Status   02/12/2025 0.64 0.51 - 0.95 mg/dL Final   07/25/2023 0.63 0.51 - 0.95 mg/dL Final   02/07/2023 0.72 0.57 - 1.00 mg/dL Final   04/12/2021 0.74 0.52 - 1.04 mg/dL Final   ]    GFR Estimate   Date Value Ref Range Status   02/12/2025 >90 >60 mL/min/1.73m2 Final     Comment:     eGFR calculated using 2021 CKD-EPI equation.   04/12/2021 88 >60 mL/min/[1.73_m2] Final     Comment:     Non  GFR Calc  Starting 12/18/2018, serum creatinine based estimated GFR (eGFR) will be   calculated using the Chronic Kidney Disease Epidemiology Collaboration   (CKD-EPI) equation.                  No components found for: \"MICORALBUMINLC\"      GFR Estimate If Black   Date Value Ref Range Status   04/12/2021 >90 >60 mL/min/[1.73_m2] Final     Comment:      GFR Calc  Starting 12/18/2018, serum creatinine based estimated GFR (eGFR) will be   calculated using the Chronic Kidney Disease Epidemiology Collaboration   (CKD-EPI) equation.       Medication (Note: This includes the hypertensive combination subclass to make sure to show all ACEI/ARB's.)       ACE Inhibitors       lisinopril (ZESTRIL) 10 MG tablet Take 2 tablets (20 mg) by mouth daily.                 Statin and ASA:  Current Outpatient Medications   Medication Sig Dispense Refill    blood glucose (NO BRAND SPECIFIED) lancets standard Use to test blood sugar 1-2 times daily or as directed. 100 each 3    blood glucose (NO BRAND SPECIFIED) test strip Use to test blood sugar 1-2 times daily or as directed. 100 strip 3    Blood Glucose Monitoring Suppl (BLOOD GLUCOSE MONITOR SYSTEM) w/Device KIT 1 kit daily 1 kit 0    empagliflozin (JARDIANCE) 10 MG TABS tablet Take 1 tablet (10 mg) by mouth daily. 90 tablet 1    lisinopril (ZESTRIL) 10 MG tablet Take 2 tablets (20 mg) by mouth daily. 90 tablet 3    rosuvastatin " (CRESTOR) 20 MG tablet Take 1 tablet (20 mg) by mouth daily. 90 tablet 1    semaglutide (OZEMPIC, 0.25 OR 0.5 MG/DOSE,) 2 MG/3ML pen Inject 0.25 mg subcutaneously every 7 days. 9 mL 1     No current facility-administered medications for this visit.     ROS:  General and Resp. completed and negative except as noted above    Histories: reviewed    OBJECTIVE:                                                    BP (!) 143/96   Pulse 96   Wt 89.8 kg (198 lb)   SpO2 100%   BMI 32.45 kg/m    Body mass index is 32.45 kg/m .   APPEARANCE: = Relaxed and in no distress  Conj/Eyelids = noninjected and lids and lashes are without inflammation  Lips/Teeth/Gums = No lesions seen, good dentition, and gums seem healthy  Thyroid = Not enlarged and no masses felt  Breath Sounds = Good air movement with no rales or rhonchi in any lung fields  Musculsktl =  Strength and ROM of major joints are within normal limits     ASSESSMENT/PLAN:                                                    Quality gaps reviewed    Kelly was seen today for diabetes, health maintenance and recheck medication.    Diagnoses and all orders for this visit:    Type 2 diabetes mellitus with hyperglycemia, without long-term current use of insulin (H)  -     Hemoglobin A1C (RMG)  -     Albumin Random Urine Quantitative with Creat Ratio; Future  -     VENOUS COLLECTION    Diabetic mononeuropathy associated with type 2 diabetes mellitus (H)  Trying to keep taking ozempic.  Will see MTM      Chronic obstructive pulmonary disease, unspecified COPD type (H)  Breathing is good for now..      Work on weight loss    Health maintenance items are reviewed in Epic and correct as of today:    Ted Saravia MD  [facility]  Family Practice  [facility]  [phone]    For any issues my office # is [phone]

## 2025-05-21 ENCOUNTER — TRANSFERRED RECORDS (OUTPATIENT)
Dept: FAMILY MEDICINE | Facility: CLINIC | Age: 65
End: 2025-05-21

## 2025-05-21 LAB — RETINOPATHY: NEGATIVE

## 2025-07-31 DIAGNOSIS — I10 BENIGN ESSENTIAL HYPERTENSION: ICD-10-CM

## 2025-08-03 RX ORDER — LISINOPRIL 10 MG/1
20 TABLET ORAL DAILY
Qty: 180 TABLET | Refills: 0 | Status: SHIPPED | OUTPATIENT
Start: 2025-08-03